# Patient Record
Sex: FEMALE | Race: WHITE | NOT HISPANIC OR LATINO
[De-identification: names, ages, dates, MRNs, and addresses within clinical notes are randomized per-mention and may not be internally consistent; named-entity substitution may affect disease eponyms.]

---

## 2017-03-17 ENCOUNTER — APPOINTMENT (OUTPATIENT)
Dept: OBGYN | Facility: CLINIC | Age: 62
End: 2017-03-17

## 2017-03-17 VITALS
WEIGHT: 131.38 LBS | DIASTOLIC BLOOD PRESSURE: 70 MMHG | HEIGHT: 65 IN | SYSTOLIC BLOOD PRESSURE: 120 MMHG | BODY MASS INDEX: 21.89 KG/M2

## 2017-03-17 DIAGNOSIS — Z01.419 ENCOUNTER FOR GYNECOLOGICAL EXAMINATION (GENERAL) (ROUTINE) W/OUT ABNORMAL FINDINGS: ICD-10-CM

## 2017-03-23 LAB
CYTOLOGY CVX/VAG DOC THIN PREP: NORMAL
HPV DNA HIGH RISK: DETECTED
HPV DNA LOW RISK: DETECTED

## 2021-12-19 ENCOUNTER — EMERGENCY (EMERGENCY)
Facility: HOSPITAL | Age: 66
LOS: 1 days | Discharge: HOME | End: 2021-12-19
Attending: EMERGENCY MEDICINE | Admitting: EMERGENCY MEDICINE
Payer: MEDICARE

## 2021-12-19 VITALS
TEMPERATURE: 99 F | OXYGEN SATURATION: 99 % | DIASTOLIC BLOOD PRESSURE: 89 MMHG | HEART RATE: 80 BPM | SYSTOLIC BLOOD PRESSURE: 133 MMHG | RESPIRATION RATE: 18 BRPM

## 2021-12-19 VITALS
OXYGEN SATURATION: 99 % | SYSTOLIC BLOOD PRESSURE: 141 MMHG | DIASTOLIC BLOOD PRESSURE: 102 MMHG | HEART RATE: 94 BPM | RESPIRATION RATE: 18 BRPM | TEMPERATURE: 98 F | WEIGHT: 130.07 LBS

## 2021-12-19 DIAGNOSIS — R10.9 UNSPECIFIED ABDOMINAL PAIN: ICD-10-CM

## 2021-12-19 DIAGNOSIS — N20.1 CALCULUS OF URETER: ICD-10-CM

## 2021-12-19 DIAGNOSIS — Z20.822 CONTACT WITH AND (SUSPECTED) EXPOSURE TO COVID-19: ICD-10-CM

## 2021-12-19 DIAGNOSIS — R11.2 NAUSEA WITH VOMITING, UNSPECIFIED: ICD-10-CM

## 2021-12-19 DIAGNOSIS — Z88.2 ALLERGY STATUS TO SULFONAMIDES: ICD-10-CM

## 2021-12-19 LAB
ALBUMIN SERPL ELPH-MCNC: 4.9 G/DL — SIGNIFICANT CHANGE UP (ref 3.3–5)
ALP SERPL-CCNC: 69 U/L — SIGNIFICANT CHANGE UP (ref 40–120)
ALT FLD-CCNC: 15 U/L — SIGNIFICANT CHANGE UP (ref 10–45)
ANION GAP SERPL CALC-SCNC: 14 MMOL/L — SIGNIFICANT CHANGE UP (ref 5–17)
APPEARANCE UR: CLEAR — SIGNIFICANT CHANGE UP
AST SERPL-CCNC: 20 U/L — SIGNIFICANT CHANGE UP (ref 10–40)
BACTERIA # UR AUTO: PRESENT /HPF
BASOPHILS # BLD AUTO: 0 K/UL — SIGNIFICANT CHANGE UP (ref 0–0.2)
BASOPHILS NFR BLD AUTO: 0 % — SIGNIFICANT CHANGE UP (ref 0–2)
BILIRUB SERPL-MCNC: 0.5 MG/DL — SIGNIFICANT CHANGE UP (ref 0.2–1.2)
BILIRUB UR-MCNC: NEGATIVE — SIGNIFICANT CHANGE UP
BUN SERPL-MCNC: 16 MG/DL — SIGNIFICANT CHANGE UP (ref 7–23)
CALCIUM SERPL-MCNC: 9.3 MG/DL — SIGNIFICANT CHANGE UP (ref 8.4–10.5)
CHLORIDE SERPL-SCNC: 99 MMOL/L — SIGNIFICANT CHANGE UP (ref 96–108)
CO2 SERPL-SCNC: 21 MMOL/L — LOW (ref 22–31)
COLOR SPEC: YELLOW — SIGNIFICANT CHANGE UP
CREAT SERPL-MCNC: 1.14 MG/DL — SIGNIFICANT CHANGE UP (ref 0.5–1.3)
DIFF PNL FLD: ABNORMAL
EOSINOPHIL # BLD AUTO: 0 K/UL — SIGNIFICANT CHANGE UP (ref 0–0.5)
EOSINOPHIL NFR BLD AUTO: 0 % — SIGNIFICANT CHANGE UP (ref 0–6)
EPI CELLS # UR: ABNORMAL /HPF (ref 0–5)
GIANT PLATELETS BLD QL SMEAR: PRESENT — SIGNIFICANT CHANGE UP
GLUCOSE SERPL-MCNC: 181 MG/DL — HIGH (ref 70–99)
GLUCOSE UR QL: 250
HCT VFR BLD CALC: 39.8 % — SIGNIFICANT CHANGE UP (ref 34.5–45)
HGB BLD-MCNC: 13.8 G/DL — SIGNIFICANT CHANGE UP (ref 11.5–15.5)
HYPOCHROMIA BLD QL: SLIGHT — SIGNIFICANT CHANGE UP
KETONES UR-MCNC: 40 MG/DL
LEUKOCYTE ESTERASE UR-ACNC: NEGATIVE — SIGNIFICANT CHANGE UP
LYMPHOCYTES # BLD AUTO: 0.3 K/UL — LOW (ref 1–3.3)
LYMPHOCYTES # BLD AUTO: 2.6 % — LOW (ref 13–44)
MANUAL SMEAR VERIFICATION: SIGNIFICANT CHANGE UP
MCHC RBC-ENTMCNC: 29.2 PG — SIGNIFICANT CHANGE UP (ref 27–34)
MCHC RBC-ENTMCNC: 34.7 GM/DL — SIGNIFICANT CHANGE UP (ref 32–36)
MCV RBC AUTO: 84.3 FL — SIGNIFICANT CHANGE UP (ref 80–100)
MONOCYTES # BLD AUTO: 0 K/UL — SIGNIFICANT CHANGE UP (ref 0–0.9)
MONOCYTES NFR BLD AUTO: 0 % — LOW (ref 2–14)
NEUTROPHILS # BLD AUTO: 11.31 K/UL — HIGH (ref 1.8–7.4)
NEUTROPHILS NFR BLD AUTO: 97.4 % — HIGH (ref 43–77)
NITRITE UR-MCNC: NEGATIVE — SIGNIFICANT CHANGE UP
PH UR: 8.5 — HIGH (ref 5–8)
PLAT MORPH BLD: ABNORMAL
PLATELET # BLD AUTO: 264 K/UL — SIGNIFICANT CHANGE UP (ref 150–400)
POTASSIUM SERPL-MCNC: 3.7 MMOL/L — SIGNIFICANT CHANGE UP (ref 3.5–5.3)
POTASSIUM SERPL-SCNC: 3.7 MMOL/L — SIGNIFICANT CHANGE UP (ref 3.5–5.3)
PROT SERPL-MCNC: 8.2 G/DL — SIGNIFICANT CHANGE UP (ref 6–8.3)
PROT UR-MCNC: NEGATIVE MG/DL — SIGNIFICANT CHANGE UP
RBC # BLD: 4.72 M/UL — SIGNIFICANT CHANGE UP (ref 3.8–5.2)
RBC # FLD: 11.8 % — SIGNIFICANT CHANGE UP (ref 10.3–14.5)
RBC BLD AUTO: ABNORMAL
RBC CASTS # UR COMP ASSIST: > 10 /HPF
SARS-COV-2 RNA SPEC QL NAA+PROBE: SIGNIFICANT CHANGE UP
SODIUM SERPL-SCNC: 134 MMOL/L — LOW (ref 135–145)
SP GR SPEC: 1.01 — SIGNIFICANT CHANGE UP (ref 1–1.03)
UROBILINOGEN FLD QL: 0.2 E.U./DL — SIGNIFICANT CHANGE UP
WBC # BLD: 11.61 K/UL — HIGH (ref 3.8–10.5)
WBC # FLD AUTO: 11.61 K/UL — HIGH (ref 3.8–10.5)
WBC UR QL: < 5 /HPF — SIGNIFICANT CHANGE UP

## 2021-12-19 PROCEDURE — 80053 COMPREHEN METABOLIC PANEL: CPT

## 2021-12-19 PROCEDURE — 99284 EMERGENCY DEPT VISIT MOD MDM: CPT | Mod: 25

## 2021-12-19 PROCEDURE — 96375 TX/PRO/DX INJ NEW DRUG ADDON: CPT

## 2021-12-19 PROCEDURE — G1004: CPT

## 2021-12-19 PROCEDURE — 81001 URINALYSIS AUTO W/SCOPE: CPT

## 2021-12-19 PROCEDURE — 85025 COMPLETE CBC W/AUTO DIFF WBC: CPT

## 2021-12-19 PROCEDURE — U0003: CPT

## 2021-12-19 PROCEDURE — 36415 COLL VENOUS BLD VENIPUNCTURE: CPT

## 2021-12-19 PROCEDURE — 99285 EMERGENCY DEPT VISIT HI MDM: CPT

## 2021-12-19 PROCEDURE — 74176 CT ABD & PELVIS W/O CONTRAST: CPT | Mod: MG

## 2021-12-19 PROCEDURE — 74176 CT ABD & PELVIS W/O CONTRAST: CPT | Mod: 26,MG

## 2021-12-19 PROCEDURE — 96374 THER/PROPH/DIAG INJ IV PUSH: CPT

## 2021-12-19 PROCEDURE — U0005: CPT

## 2021-12-19 RX ORDER — TAMSULOSIN HYDROCHLORIDE 0.4 MG/1
1 CAPSULE ORAL
Qty: 7 | Refills: 0
Start: 2021-12-19 | End: 2021-12-25

## 2021-12-19 RX ORDER — KETOROLAC TROMETHAMINE 30 MG/ML
15 SYRINGE (ML) INJECTION ONCE
Refills: 0 | Status: DISCONTINUED | OUTPATIENT
Start: 2021-12-19 | End: 2021-12-19

## 2021-12-19 RX ORDER — IBUPROFEN 200 MG
1 TABLET ORAL
Qty: 30 | Refills: 0
Start: 2021-12-19

## 2021-12-19 RX ORDER — ONDANSETRON 8 MG/1
4 TABLET, FILM COATED ORAL ONCE
Refills: 0 | Status: COMPLETED | OUTPATIENT
Start: 2021-12-19 | End: 2021-12-19

## 2021-12-19 RX ORDER — ONDANSETRON 8 MG/1
1 TABLET, FILM COATED ORAL
Qty: 9 | Refills: 0
Start: 2021-12-19

## 2021-12-19 RX ORDER — MORPHINE SULFATE 50 MG/1
4 CAPSULE, EXTENDED RELEASE ORAL ONCE
Refills: 0 | Status: DISCONTINUED | OUTPATIENT
Start: 2021-12-19 | End: 2021-12-19

## 2021-12-19 RX ORDER — SODIUM CHLORIDE 9 MG/ML
1000 INJECTION INTRAMUSCULAR; INTRAVENOUS; SUBCUTANEOUS ONCE
Refills: 0 | Status: COMPLETED | OUTPATIENT
Start: 2021-12-19 | End: 2021-12-19

## 2021-12-19 RX ORDER — OXYCODONE HYDROCHLORIDE 5 MG/1
1 TABLET ORAL
Qty: 10 | Refills: 0
Start: 2021-12-19

## 2021-12-19 RX ORDER — METOCLOPRAMIDE HCL 10 MG
10 TABLET ORAL ONCE
Refills: 0 | Status: COMPLETED | OUTPATIENT
Start: 2021-12-19 | End: 2021-12-19

## 2021-12-19 RX ADMIN — MORPHINE SULFATE 4 MILLIGRAM(S): 50 CAPSULE, EXTENDED RELEASE ORAL at 10:04

## 2021-12-19 RX ADMIN — Medication 15 MILLIGRAM(S): at 08:47

## 2021-12-19 RX ADMIN — ONDANSETRON 4 MILLIGRAM(S): 8 TABLET, FILM COATED ORAL at 08:46

## 2021-12-19 RX ADMIN — Medication 10 MILLIGRAM(S): at 09:43

## 2021-12-19 RX ADMIN — SODIUM CHLORIDE 1000 MILLILITER(S): 9 INJECTION INTRAMUSCULAR; INTRAVENOUS; SUBCUTANEOUS at 08:46

## 2021-12-19 NOTE — ED PROVIDER NOTE - CLINICAL SUMMARY MEDICAL DECISION MAKING FREE TEXT BOX
67 y/o f presents c/o left flank pain, n/v since last night; pt afebrile in ED, uncomfortable on exam.  Labs and urine pending, CT done and result pending, H&P consistent with ureteral stone.  Pt given toradol and zofran, IV fluid, is improving.  Will f/u results and reassess.

## 2021-12-19 NOTE — ED PROVIDER NOTE - PATIENT PORTAL LINK FT
You can access the FollowMyHealth Patient Portal offered by [facility] by registering at the following website: [email or web address]. By joining [employer]’s FollowMyHealth portal, you will also be able to view your health information using other applications (apps) compatible with our system.

## 2021-12-19 NOTE — ED PROVIDER NOTE - NSFOLLOWUPCLINICS_GEN_ALL_ED_FT
[facility]rology [facility]  Urology  [address], [address]  [place], [place] [address]  Phone: [phone]  Fax:

## 2021-12-19 NOTE — ED PROVIDER NOTE - CARE PROVIDER_API CALL
Marly Julian  Urology  [address] [place] [address]  Phone: [phone]  Fax: [phone]  Follow Up Time:     Feliciano Pastrana)  Urology  [address] [place] [address]  Phone: [phone]  Fax: [phone]  Follow Up Time:

## 2021-12-19 NOTE — ED ADULT NURSE NOTE - OBJECTIVE STATEMENT
65yo female c/o of Flank Pain to left side x 2 days. 65yo female c/o of Flank Pain to left side x 2 days. PT advised she started feeling sick 5 days prior on Wednesday and yesterday started having vomiting and excruciating pain.

## 2021-12-19 NOTE — ED ADULT NURSE REASSESSMENT NOTE - NS ED NURSE REASSESS COMMENT FT1
PT voided with use of Bedpan. PT bed changed at this time as some urine got on her bed. PT noted able to stand up and move around freely under own power. PT noted with Kidney stone on CT Scan and pending disposition at this time. Daughter at bedside.

## 2021-12-19 NOTE — ED PROVIDER NOTE - CARE PROVIDERS DIRECT ADDRESSES
[email or web address]

## 2021-12-19 NOTE — ED PROVIDER NOTE - NSFOLLOWUPINSTRUCTIONS_ED_ALL_ED_FT
Ureteral Stones    WHAT YOU NEED TO KNOW:    A ureteral stone forms in the kidney and moves down the ureter and gets stuck there. The ureter is the tube that takes urine from the kidney to the bladder. Stones can form in the urinary system when your urine has high levels of minerals and salts. Urinary stones can be made of uric acid, calcium, phosphate, or oxalate crystals.     Kidney, Ureters, Bladder         DISCHARGE INSTRUCTIONS:    Seek care immediately if:   •You have severe pain that does not improve, even after you take medicine.      •You have vomiting that is not relieved by medicine.      Call your doctor if:   •You develop a fever.       •You have questions or concerns about your condition or care.      Medicines: You may need any of the following:   •NSAIDs, such as ibuprofen, help decrease swelling, pain, and fever. This medicine is available with or without a doctor's order. NSAIDs can cause stomach bleeding or kidney problems in certain people. If you take blood thinner medicine, always ask your healthcare provider if NSAIDs are safe for you. Always read the medicine label and follow directions.      •Prescription pain medicine may be given. Ask your healthcare provider how to take this medicine safely. Some prescription pain medicines contain acetaminophen. Do not take other medicines that contain acetaminophen without talking to your healthcare provider. Too much acetaminophen may cause liver damage. Prescription pain medicine may cause constipation. Ask your healthcare provider how to prevent or treat constipation.       •Nausea medicine may help calm your stomach and prevent vomiting.      •Take your medicine as directed. Contact your healthcare provider if you think your medicine is not helping or if you have side effects. Tell him or her if you are allergic to any medicine. Keep a list of the medicines, vitamins, and herbs you take. Include the amounts, and when and why you take them. Bring the list or the pill bottles to follow-up visits. Carry your medicine list with you in case of an emergency.      What you can do to manage uretal stones:   •Drink more liquids. Your healthcare provider may tell you to drink at least 8 to 12 (eight-ounce) cups of liquid each day. This helps flush out the stones when you urinate. Water is the best liquid to drink. Your urine will be clear (not yellow) if you are drinking enough liquid.      •Strain your urine every time you go to the bathroom. Urinate through a strainer or a piece of thin cloth to catch the stone. Take the stone to your healthcare provider so it can be sent to a lab for tests. This will help your healthcare providers plan the best treatment for you.  Look for Stones in the Filter           •Ask your healthcare provider about any nutrition changes you need to make. You may need to limit certain foods, such as foods high in sodium (salt) or protein. You may need to limit leafy green vegetables, carbonated drinks, or beer. These changes will depend on the kind of stones you have.  High Oxalate Foods           •Stay active. Your stones may pass more easily if you stay active. Exercise can also help you manage your weight. Ask about the best activities for you.  Ways to Be Physically Active           After you pass the ureteral stone: Your healthcare provider may order a 24-hour urine test. Results from a 24-hour urine test will help your healthcare provider plan ways to prevent more stones from forming. Your healthcare provider will give you more instructions.    Follow up with your doctor as directed: You may need to return for more tests. Write down your questions so you remember to ask them during your visits.

## 2021-12-19 NOTE — ED PROVIDER NOTE - OBJECTIVE STATEMENT
67 y/o f hx kidney stone presents c/o left flank pain which has been ongoing since last night, associated with nausea.  Pt stating pain is coming in waves, similar to previous kidney stone.  Pt has vomited a few times overnight, still feeling nauseous.  Pt also feeling the urge to urinate but not much comes out when she goes.  Denies fever, chills, dysuria, hematuria, CP, SOB, all other ROS negative.

## 2021-12-19 NOTE — ED PROVIDER NOTE - ATTENDING CONTRIBUTION TO CARE
66 year old female presents to ED with concern for left flank pain - notes history of stones, states this feels similar.  Discomfort began yesterday evening, with strong waves and associated n/v.  Notes urgency , no dysuria, hematuria or fever.  Took motrin yesterday, no pain medication today.  On exam, uncomfortable appearing.  HRRR, lungs clear.  Abd soft, NT/ND, no CVA tenderness, no tenderness to paraspinal msk.  Will obtain labs, CT imaging for stone barros, give pain medications, fluid and dispo accordingly.

## 2021-12-19 NOTE — ED PROVIDER NOTE - PROGRESS NOTE DETAILS
CT shows 3mm stone in right UVJ, pt feeling improved with pain meds, creatinine 1.1, given IV fluid.  Will d/c to f/u with urology, given pain meds, filter.  Pt advised to return to ED if sx worsen.

## 2021-12-29 ENCOUNTER — APPOINTMENT (OUTPATIENT)
Dept: UROLOGY | Facility: CLINIC | Age: 66
End: 2021-12-29
Payer: MEDICARE

## 2021-12-29 VITALS — SYSTOLIC BLOOD PRESSURE: 118 MMHG | DIASTOLIC BLOOD PRESSURE: 77 MMHG | HEART RATE: 80 BPM | TEMPERATURE: 97.6 F

## 2021-12-29 DIAGNOSIS — N20.0 CALCULUS OF KIDNEY: ICD-10-CM

## 2021-12-29 PROCEDURE — 99204 OFFICE O/P NEW MOD 45 MIN: CPT

## 2021-12-29 NOTE — PHYSICAL EXAM
[Normal Appearance] : normal appearance [General Appearance - In No Acute Distress] : no acute distress [Heart Rate And Rhythm] : Heart rate and rhythm were normal [] : no respiratory distress [Costovertebral Angle Tenderness] : no ~M costovertebral angle tenderness

## 2021-12-29 NOTE — HISTORY OF PRESENT ILLNESS
[FreeTextEntry1] : Name CLOTILDE WOO \par MRN [id number] \par  1955 \par ------------------------------------------------------------------------------------------------------------------------------------------- \par Date of First Visit: 21\par Referring Provider/PCP: unknown \par ------------------------------------------------------------------------------------------------------------------------------------------- \par CC: kidney stones \par \par History of Present Illness: CLOTILDE WOO is a 66 year F who presents for evaluation of kidney stones. She recently seen in the emergency department on  for right flank pain.  CT demonstrated a 4 mm LEFT UVJ stone. Since that time her pain has been paroxysmal and controllable with extra strength Tylenol.  Last episode of pain was yesterday.  Acknowledges that she has urinary urgency symptoms in addition to the left-sided renal colic, all of which is well controlled with the Tylenol.  Denies seeing any stone in her urine despite straining.  She is motivated to pass the stone spontaneously without intervention if possible.  Given her urinary urgency, she is concerned about a UTI.\par \par Imaging: CT scan from 21 can be found in [employer] PACS. Findings: 4 mm left UVJ stone.  No other kidney stones bilaterally.\par \par Previous urine cultures: None on record\par \par Kidney Stone History:  \par First-ever symptomatic stone - possibly. Patient states that she had a severe episode of right-sided flank pain approximately 25 to 30 years ago.  Due to unavailability of CT scan at that time it was never confirmed that she had a kidney stone.  She had never seen anything passing her urine.\par Concurrent asymptomatic stone(s) - None\par Prior incidental/asymptomatic stone(s) - No\par Comorbidities –noncontributory\par Family history of kidney stones -none \par Previous metabolic evaluation -none\par \par \par

## 2021-12-29 NOTE — ASSESSMENT
[FreeTextEntry1] : Assessment:  \par \par CLOTILDE WOO is a 66 year F with urolithiasis. She currently has a 4 mm stone in the left UVJ, diagnosed 1.5 weeks ago.  Pain is well controlled on Tylenol.  She has yet to see a stone in her urine.  Presumably this is her first-ever stone.  No known risk factors for recurrent nephrolithiasis.\par \par We discussed the risks, benefits, and alternatives for stone management, including watchful waiting (with or without medical expulsive therapy) and surgical intervention. \par \par Watchful waiting:  \par This is an acceptable initial approach for ureteral stones <10?mm if the stone can be expected to pass spontaneously within a reasonable time and the pain is tolerable without evidence of infection or renal compromise. I explained that there is a strong correlation between stone size and location and the likelihood for spontaneous stone passage. In general, 68% of stones <5mm and 47% of stones >5mm will pass spontaneously over a mean time of approximately 2 weeks. This rate for upper, mid, and distal ureteral stones is approximately 50%, 60% and 70%, respectively. While many advocate intervention if the stone fails to pass within 4 weeks of the start of symptoms, there is evidence of spontaneous passage rates up to 98% at 6 weeks for stones of <5mm. Therefore, longer follow-up (up to 6 weeks) may be warranted, particularly for smaller stones. \par \par  * Medical expulsive therapy (MET) may be added to watchful waiting as it is thought to expedite stone expulsion. Traditionally, off-label alpha-blocker therapy is prescribed based on prior trials supporting their use in distal stones >5mm. However, I explained that this represents an increasingly controversial topic due to the contradictory results of more contemporary high-quality studies, the number of which outweigh those demonstrating a therapeutic benefit. At the same time, the risks of short-term alpha-blocker therapy are low and therefore this may be an option for well-informed, select patients. \par \par Shock Wave Lithotripsy (SWL):  \par This is the least invasive form of surgery for stones and an excellent option for select stones. For ureteral stones, SWL is limited to the upper ureter. I explained how the procedure is performed and the concept behind shock waves. Procedural success is dependent on several stone and environmental factors such as the stone composition or density on CT, the presence or absence of hydronephrosis, size of the stone, stone location, and skin-to-stone distance on CT scan (patient’s body habitus). For these reasons, not all stones/patients are good candidates for SWL. The chances of being stone free after SWL are often much lower compared to other modalities, such as ureteroscopy, except in select cases where stone-free rates are comparable. Therefore, there is a risk that the patient would need subsequent procedures to render them stone-free. Since this is a non-invasive procedure, we are relying on the kidney to spontaneously pass the resultant stone fragments. At the same time, this procedure does carry some perioperative risks, mainly bleeding and infection, as well as the small risk of developing obstruction due to passage of stone fragments, which could require urgent placement of a double-J ureteral stent or nephrostomy tube. \par \par Ureteroscopy:  \par I explained the technique in detail and how it is performed. Though more invasive than SWL, high stone free rates (approaching 90% for ureteral stones; 60% for renal stones) have made it increasingly popular among physicians and patients. Very commonly, a ureteral stent is left in place at the conclusion of the procedure, but only if needed. I explained that if a stent is placed, it would need to be removed either cystoscopically under local anesthesia or it may have a string left externally through the urethra for removal in a couple of days after the procedure. Risks of ureteroscopy include, but are not limited to, bleeding, infection, injury to the bladder or ureter, ureteral perforation, ureteral stricture, and other risks involved with general anesthesia. There is also the risk that the procedure needs to be staged into more than one session based on the patient's internal anatomy and the size of the stone(s). Finally, dilation of the ureter and/or ureteral stent placement prior to definitive ureteroscopy may be necessary to achieve ureteral access safely. \par \par I explained all these options to the patient and my assessment of the risks and benefits of each approach. \par \par I have answered all the patient’s questions and she has elected to undergo a period of watchful waiting to attempt spontaneous stone passage. Strict precautions to contact our team were given for intractable pain, inability to tolerate oral intake, fevers/chills concerning for infection, or any other concerns.  \par \par \par Plan:  \par \par - UA/UCx\par - Continue to strain urine\par - Follow-up in 4 weeks with non-contrast CT scan performed just prior to visit\par - Instructed to contact the office if she sees the stone in her urine\par \par \par

## 2021-12-30 ENCOUNTER — NON-APPOINTMENT (OUTPATIENT)
Age: 66
End: 2021-12-30

## 2021-12-30 LAB
APPEARANCE: CLEAR
BACTERIA: NEGATIVE
BILIRUBIN URINE: NEGATIVE
BLOOD URINE: NEGATIVE
COLOR: COLORLESS
GLUCOSE QUALITATIVE U: NEGATIVE
HYALINE CASTS: 0 /LPF
KETONES URINE: NEGATIVE
LEUKOCYTE ESTERASE URINE: NEGATIVE
MICROSCOPIC-UA: NORMAL
NITRITE URINE: NEGATIVE
PH URINE: 6.5
PROTEIN URINE: NEGATIVE
RED BLOOD CELLS URINE: 1 /HPF
SPECIFIC GRAVITY URINE: 1
SQUAMOUS EPITHELIAL CELLS: 1 /HPF
UROBILINOGEN URINE: NORMAL
WHITE BLOOD CELLS URINE: 0 /HPF

## 2022-01-03 LAB — BACTERIA UR CULT: NORMAL

## 2022-10-01 ENCOUNTER — EMERGENCY (EMERGENCY)
Facility: HOSPITAL | Age: 67
LOS: 1 days | Discharge: HOME | End: 2022-10-01
Admitting: EMERGENCY MEDICINE
Payer: MEDICARE

## 2022-10-01 VITALS
HEART RATE: 98 BPM | HEIGHT: 64 IN | TEMPERATURE: 98 F | SYSTOLIC BLOOD PRESSURE: 133 MMHG | WEIGHT: 130.95 LBS | OXYGEN SATURATION: 97 % | DIASTOLIC BLOOD PRESSURE: 84 MMHG | RESPIRATION RATE: 16 BRPM

## 2022-10-01 PROCEDURE — 99283 EMERGENCY DEPT VISIT LOW MDM: CPT

## 2022-10-01 NOTE — ED PROVIDER NOTE - OBJECTIVE STATEMENT
66 yo f with no pmh c/o hitting her head on a doorknob 2 days ago. pt was bending down to tie her shoes and she stood up quick and hit her head on the doorknob. Denies LOC. Pt denies ha, dizziness, n/v, visual changes. Pt not on blood thinners. Pt was feeling anxious so decided to come to the ED to be evaluated.

## 2022-10-01 NOTE — ED ADULT NURSE NOTE - OBJECTIVE STATEMENT
67 y.o female reports to ED c/o head discomfort after hitting back of head on door knob thursday. Pt denies dizziness, change in vision, n/v, LOC, anticoagulant use.

## 2022-10-01 NOTE — ED PROVIDER NOTE - PATIENT PORTAL LINK FT
You can access the FollowMyHealth Patient Portal offered by [facility] by registering at the following website: [email or web address]. By joining [employer]’s FollowMyHealth portal, you will also be able to view your health information using other applications (apps) compatible with our system.

## 2022-10-01 NOTE — ED ADULT TRIAGE NOTE - CHIEF COMPLAINT QUOTE
pt stated " I hit my head with door last Thursday and I'm concerned"  denies blood thinners use, or LOC.

## 2022-10-01 NOTE — ED PROVIDER NOTE - CLINICAL SUMMARY MEDICAL DECISION MAKING FREE TEXT BOX
66 yo f with no pmh c/o hitting her head on a doorknob 2 days ago. pt was bending down to tie her shoes and she stood up quick and hit her head on the doorknob. Denies LOC. Pt denies ha, dizziness, n/v, visual changes. Pt not on blood thinners. Pt was feeling anxious so decided to come to the ED to be evaluated. pt anxious appearing. No signs of head trauma. Neurologically intact. No indication for CT scan. Pt reassured. Return precautions discussed

## 2022-10-01 NOTE — ED PROVIDER NOTE - PHYSICAL EXAMINATION
CONSTITUTIONAL: pt anxious   HEAD: Normocephalic; atraumatic.   EYES: PERRL; EOM intact; conjunctiva and sclera clear  ENT: normal nose; no rhinorrhea; normal pharynx with no erythema or lesions.   NECK: Supple; non-tender;   CARDIOVASCULAR: rrr, no audible murmurs   RESPIRATORY: Breathing easily;   MSK: FROM at all extremities, normal tone   EXT: No cyanosis or edema; N/V intact  SKIN: Normal for age and race; warm; dry; good turgor; no apparent lesions or rash.  neuro: AAO x 3, CN II-XII intact, normal speech, strength 5/5 bilateral upper and lower extremities, sensation intact, cerebellum intact, no ataxia, follows commands appropriately

## 2022-10-03 DIAGNOSIS — S09.90XA UNSPECIFIED INJURY OF HEAD, INITIAL ENCOUNTER: ICD-10-CM
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
## 2022-10-03 DIAGNOSIS — Z88.2 ALLERGY STATUS TO SULFONAMIDES: ICD-10-CM

## 2022-10-03 DIAGNOSIS — S00.03XA CONTUSION OF SCALP, INITIAL ENCOUNTER: ICD-10-CM

## 2023-07-03 ENCOUNTER — EMERGENCY (EMERGENCY)
Facility: HOSPITAL | Age: 68
LOS: 1 days | Discharge: HOME | End: 2023-07-03
Attending: STUDENT IN AN ORGANIZED HEALTH CARE EDUCATION/TRAINING PROGRAM | Admitting: STUDENT IN AN ORGANIZED HEALTH CARE EDUCATION/TRAINING PROGRAM
Payer: MEDICARE

## 2023-07-03 VITALS
TEMPERATURE: 98 F | HEART RATE: 78 BPM | RESPIRATION RATE: 18 BRPM | OXYGEN SATURATION: 98 % | SYSTOLIC BLOOD PRESSURE: 126 MMHG | DIASTOLIC BLOOD PRESSURE: 81 MMHG

## 2023-07-03 VITALS
HEIGHT: 65 IN | HEART RATE: 83 BPM | SYSTOLIC BLOOD PRESSURE: 119 MMHG | WEIGHT: 134.92 LBS | RESPIRATION RATE: 16 BRPM | TEMPERATURE: 98 F | OXYGEN SATURATION: 98 % | DIASTOLIC BLOOD PRESSURE: 79 MMHG

## 2023-07-03 DIAGNOSIS — Z88.2 ALLERGY STATUS TO SULFONAMIDES: ICD-10-CM
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
## 2023-07-03 DIAGNOSIS — M79.662 PAIN IN LEFT LOWER LEG: ICD-10-CM

## 2023-07-03 DIAGNOSIS — S80.12XA CONTUSION OF LEFT LOWER LEG, INITIAL ENCOUNTER: ICD-10-CM
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
## 2023-07-03 DIAGNOSIS — S80.02XA CONTUSION OF LEFT KNEE, INITIAL ENCOUNTER: ICD-10-CM

## 2023-07-03 PROCEDURE — 73590 X-RAY EXAM OF LOWER LEG: CPT

## 2023-07-03 PROCEDURE — 73564 X-RAY EXAM KNEE 4 OR MORE: CPT

## 2023-07-03 PROCEDURE — 73552 X-RAY EXAM OF FEMUR 2/>: CPT

## 2023-07-03 PROCEDURE — 73564 X-RAY EXAM KNEE 4 OR MORE: CPT | Mod: 26,LT

## 2023-07-03 PROCEDURE — 73590 X-RAY EXAM OF LOWER LEG: CPT | Mod: 26,LT

## 2023-07-03 PROCEDURE — 99285 EMERGENCY DEPT VISIT HI MDM: CPT

## 2023-07-03 PROCEDURE — 93971 EXTREMITY STUDY: CPT | Mod: 26,LT

## 2023-07-03 PROCEDURE — 93971 EXTREMITY STUDY: CPT

## 2023-07-03 PROCEDURE — 73552 X-RAY EXAM OF FEMUR 2/>: CPT | Mod: 26,LT

## 2023-07-03 PROCEDURE — 99284 EMERGENCY DEPT VISIT MOD MDM: CPT | Mod: 25

## 2023-07-03 NOTE — ED PROVIDER NOTE - PROGRESS NOTE DETAILS
MD Micha:   Ultrasound duplex of lower extremity shows no DVT, x-ray on my read shows no acute fracture or dislocation.  I explained to patient that the x-ray final read by the attending radiologist will likely be available on follow my health by tomorrow and she should follow-up with the final read.  Patient states understanding.  I explained to patient that if her symptoms  to persist or worsen that she should follow up with a repeat ultrasound within 1 week.  Patient states understanding.  Patient states she has her own orthopedic surgeon to follow-up with.  Will DC with Ace wrap and instructions for rest, ice, compression, and elevation.

## 2023-07-03 NOTE — ED PROVIDER NOTE - PATIENT PORTAL LINK FT
You can access the FollowMyHealth Patient Portal offered by [facility] by registering at the following website: [email or web address]. By joining [employer]’s FollowMyHealth portal, you will also be able to view your health information using other applications (apps) compatible with our system.
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
## 2023-07-03 NOTE — ED PROVIDER NOTE - WR ORDER DATE AND TIME 3
"Prince Mazariegos is a 68 y.o. female here for a non-provider visit for:   FLU    Reason for immunization: Annual Flu Vaccine  Immunization records indicate need for vaccine: Yes, confirmed with Epic  Minimum interval has been met for this vaccine: Yes  ABN completed: No    Order and dose verified by: ACE  VIS Dated  8/7/15 was given to patient: Yes  All IAC Questionnaire questions were answered \"No.\"    Patient tolerated injection and no adverse effects were observed or reported: Yes    Pt scheduled for next dose in series: No    " 03-Jul-2023 19:12

## 2023-07-03 NOTE — ED PROVIDER NOTE - NS ED ROS FT
Positive: Left leg pain, left knee pain and bruising   negative: Fever, chills, congestion, cough, chest pain, shortness of breath, nausea, vomiting, diarrhea, abdominal pain, dysuria, hematuria, rash, focal numbness or weakness

## 2023-07-03 NOTE — ED ADULT NURSE NOTE - NSFALLRISKINTERV_ED_ALL_ED
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
## 2023-07-03 NOTE — ED PROVIDER NOTE - PHYSICAL EXAMINATION
GENERAL:  Awake, alert and in NAD, non-toxic appearing  ENMT: Airway patent  EYES: conjunctiva clear  CARDIAC: Regular rate, regular rhythm.  Heart sounds S1, S2, no S3, S4. No murmurs, rubs or gallops.  RESPIRATORY: Breath sounds clear and equal in bilateral anterior lung fields, no wheezes/ronchi/crackles/stridor; pt breathing and speaking comfortably with no increased WOB, no accessory mm. use, no intercostal retractions, no nasal flaring  GI: abdomen soft, non-distended, non-tender, no rebound or guarding.  SKIN: warm and dry, no rashes  PSYCH: awake, alert, calm and cooperative  EXTREMITIES:   BLE: 5/5 motor strength bilat hip flexors, knee flexors and extensors, plantar and dorsiflexors, hallux flexors and extensors. sensation intact to light touch bilat L3-S1; 2+ DP pulses bilat; compartment soft, skin: ecchymosis over anterior L knee and anterior L lower leg, skin is intact. no point ttp over medial/lateral malleolus or midfoot   NEURO: gcs 15
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
## 2023-07-03 NOTE — ED PROVIDER NOTE - NSFOLLOWUPINSTRUCTIONS_ED_ALL_ED_FT
1. You were seen for leg pain. A copy of any of your resulted labs, imaging, and findings have been provided to you. Make sure to view any test results that may not have yet resulted at the time of your discharge by creating a FollowMyHealth account at: [email or web address] to sign up for FollowMyHealth. Please review all of your lab, imaging, and all other results in their entirety with your primary care doctor.   2. Continue to take your home medications as prescribed. Keep your left leg elevated, iced, rested, and compressed with an ace wrap. Take over the counter motrin 600 mg with food every six hours (do not exceed 3,200 mg in a 24 hour period) and supplement with tylenol 650 mg every six hours (do not exceed 4000 mg of tylenol in a 24 hour period and do not drink alcohol while taking tylenol) between the motrin dosages to have a layered effect. Consult a pharmacist or your primary care doctor with any questions.   IF YOU LEG SWELLING OR PAIN PERSIST, OBTAIN A REPEAT ULTRASOUND OF YOUR LEFT LEG TO ASSESS FOR DEEP VEIN THROMBOSIS (BLOOD CLOT) IN 1 WEEK.  3. Follow up with your primary care doctor within 48 hours to assess the symptoms you were seen for in the emergency department and to review all results from your visit. If you don't have a doctor, call [phone] to make an appointment.  4. Return immediately to the emergency department for new, persistent, or worsening symptoms or signs. Return immediately to the emergency department if you have chest pain, shortness of breath, loss of consciousness, inability to move or feel your leg, rash, or worsening swelling.   5. For your for health, you should make healthy food choices and be physically active. Also, you should not smoke or use drugs, and you should not drink alcohol in excess. Please visit [email or web address] for resources and more information.       Please reach out to Nicol Lyle ([facility] ED clinical referral coordinator) to assist you with your follow-up appointment With an orthopedic surgeon if you are unable to reach her 1.    Monday - Friday 11am-7pm  [phone]  [email or web address]

## 2023-11-22 ENCOUNTER — EMERGENCY (EMERGENCY)
Facility: HOSPITAL | Age: 68
LOS: 1 days | Discharge: HOME | End: 2023-11-22
Attending: EMERGENCY MEDICINE | Admitting: EMERGENCY MEDICINE
Payer: MEDICARE

## 2023-11-22 ENCOUNTER — TRANSCRIPTION ENCOUNTER (OUTPATIENT)
Age: 68
End: 2023-11-22

## 2023-11-22 VITALS
WEIGHT: 121.92 LBS | OXYGEN SATURATION: 98 % | SYSTOLIC BLOOD PRESSURE: 124 MMHG | RESPIRATION RATE: 15 BRPM | HEART RATE: 65 BPM | DIASTOLIC BLOOD PRESSURE: 77 MMHG | TEMPERATURE: 98 F

## 2023-11-22 VITALS
DIASTOLIC BLOOD PRESSURE: 73 MMHG | RESPIRATION RATE: 18 BRPM | OXYGEN SATURATION: 99 % | TEMPERATURE: 99 F | HEART RATE: 65 BPM | SYSTOLIC BLOOD PRESSURE: 119 MMHG

## 2023-11-22 LAB
ANION GAP SERPL CALC-SCNC: 13 MMOL/L — SIGNIFICANT CHANGE UP (ref 5–17)
ANION GAP SERPL CALC-SCNC: 13 MMOL/L — SIGNIFICANT CHANGE UP (ref 5–17)
BASOPHILS # BLD AUTO: 0.06 K/UL — SIGNIFICANT CHANGE UP (ref 0–0.2)
BASOPHILS # BLD AUTO: 0.06 K/UL — SIGNIFICANT CHANGE UP (ref 0–0.2)
BASOPHILS NFR BLD AUTO: 0.6 % — SIGNIFICANT CHANGE UP (ref 0–2)
BASOPHILS NFR BLD AUTO: 0.6 % — SIGNIFICANT CHANGE UP (ref 0–2)
BUN SERPL-MCNC: 11 MG/DL — SIGNIFICANT CHANGE UP (ref 7–23)
BUN SERPL-MCNC: 11 MG/DL — SIGNIFICANT CHANGE UP (ref 7–23)
CALCIUM SERPL-MCNC: 9.5 MG/DL — SIGNIFICANT CHANGE UP (ref 8.4–10.5)
CALCIUM SERPL-MCNC: 9.5 MG/DL — SIGNIFICANT CHANGE UP (ref 8.4–10.5)
CHLORIDE SERPL-SCNC: 98 MMOL/L — SIGNIFICANT CHANGE UP (ref 96–108)
CHLORIDE SERPL-SCNC: 98 MMOL/L — SIGNIFICANT CHANGE UP (ref 96–108)
CO2 SERPL-SCNC: 23 MMOL/L — SIGNIFICANT CHANGE UP (ref 22–31)
CO2 SERPL-SCNC: 23 MMOL/L — SIGNIFICANT CHANGE UP (ref 22–31)
CREAT SERPL-MCNC: 0.65 MG/DL — SIGNIFICANT CHANGE UP (ref 0.5–1.3)
CREAT SERPL-MCNC: 0.65 MG/DL — SIGNIFICANT CHANGE UP (ref 0.5–1.3)
EGFR: 96 ML/MIN/1.73M2 — SIGNIFICANT CHANGE UP
EGFR: 96 ML/MIN/1.73M2 — SIGNIFICANT CHANGE UP
EOSINOPHIL # BLD AUTO: 0.02 K/UL — SIGNIFICANT CHANGE UP (ref 0–0.5)
EOSINOPHIL # BLD AUTO: 0.02 K/UL — SIGNIFICANT CHANGE UP (ref 0–0.5)
EOSINOPHIL NFR BLD AUTO: 0.2 % — SIGNIFICANT CHANGE UP (ref 0–6)
EOSINOPHIL NFR BLD AUTO: 0.2 % — SIGNIFICANT CHANGE UP (ref 0–6)
GLUCOSE SERPL-MCNC: 105 MG/DL — HIGH (ref 70–99)
GLUCOSE SERPL-MCNC: 105 MG/DL — HIGH (ref 70–99)
HCT VFR BLD CALC: 40.9 % — SIGNIFICANT CHANGE UP (ref 34.5–45)
HCT VFR BLD CALC: 40.9 % — SIGNIFICANT CHANGE UP (ref 34.5–45)
HGB BLD-MCNC: 13.6 G/DL — SIGNIFICANT CHANGE UP (ref 11.5–15.5)
HGB BLD-MCNC: 13.6 G/DL — SIGNIFICANT CHANGE UP (ref 11.5–15.5)
IMM GRANULOCYTES NFR BLD AUTO: 0.5 % — SIGNIFICANT CHANGE UP (ref 0–0.9)
IMM GRANULOCYTES NFR BLD AUTO: 0.5 % — SIGNIFICANT CHANGE UP (ref 0–0.9)
LYMPHOCYTES # BLD AUTO: 0.88 K/UL — LOW (ref 1–3.3)
LYMPHOCYTES # BLD AUTO: 0.88 K/UL — LOW (ref 1–3.3)
LYMPHOCYTES # BLD AUTO: 8.6 % — LOW (ref 13–44)
LYMPHOCYTES # BLD AUTO: 8.6 % — LOW (ref 13–44)
MCHC RBC-ENTMCNC: 28.8 PG — SIGNIFICANT CHANGE UP (ref 27–34)
MCHC RBC-ENTMCNC: 28.8 PG — SIGNIFICANT CHANGE UP (ref 27–34)
MCHC RBC-ENTMCNC: 33.3 GM/DL — SIGNIFICANT CHANGE UP (ref 32–36)
MCHC RBC-ENTMCNC: 33.3 GM/DL — SIGNIFICANT CHANGE UP (ref 32–36)
MCV RBC AUTO: 86.5 FL — SIGNIFICANT CHANGE UP (ref 80–100)
MCV RBC AUTO: 86.5 FL — SIGNIFICANT CHANGE UP (ref 80–100)
MONOCYTES # BLD AUTO: 0.63 K/UL — SIGNIFICANT CHANGE UP (ref 0–0.9)
MONOCYTES # BLD AUTO: 0.63 K/UL — SIGNIFICANT CHANGE UP (ref 0–0.9)
MONOCYTES NFR BLD AUTO: 6.2 % — SIGNIFICANT CHANGE UP (ref 2–14)
MONOCYTES NFR BLD AUTO: 6.2 % — SIGNIFICANT CHANGE UP (ref 2–14)
NEUTROPHILS # BLD AUTO: 8.57 K/UL — HIGH (ref 1.8–7.4)
NEUTROPHILS # BLD AUTO: 8.57 K/UL — HIGH (ref 1.8–7.4)
NEUTROPHILS NFR BLD AUTO: 83.9 % — HIGH (ref 43–77)
NEUTROPHILS NFR BLD AUTO: 83.9 % — HIGH (ref 43–77)
NRBC # BLD: 0 /100 WBCS — SIGNIFICANT CHANGE UP (ref 0–0)
NRBC # BLD: 0 /100 WBCS — SIGNIFICANT CHANGE UP (ref 0–0)
PLATELET # BLD AUTO: 262 K/UL — SIGNIFICANT CHANGE UP (ref 150–400)
PLATELET # BLD AUTO: 262 K/UL — SIGNIFICANT CHANGE UP (ref 150–400)
POTASSIUM SERPL-MCNC: 4 MMOL/L — SIGNIFICANT CHANGE UP (ref 3.5–5.3)
POTASSIUM SERPL-MCNC: 4 MMOL/L — SIGNIFICANT CHANGE UP (ref 3.5–5.3)
POTASSIUM SERPL-SCNC: 4 MMOL/L — SIGNIFICANT CHANGE UP (ref 3.5–5.3)
POTASSIUM SERPL-SCNC: 4 MMOL/L — SIGNIFICANT CHANGE UP (ref 3.5–5.3)
RBC # BLD: 4.73 M/UL — SIGNIFICANT CHANGE UP (ref 3.8–5.2)
RBC # BLD: 4.73 M/UL — SIGNIFICANT CHANGE UP (ref 3.8–5.2)
RBC # FLD: 12 % — SIGNIFICANT CHANGE UP (ref 10.3–14.5)
RBC # FLD: 12 % — SIGNIFICANT CHANGE UP (ref 10.3–14.5)
SODIUM SERPL-SCNC: 134 MMOL/L — LOW (ref 135–145)
SODIUM SERPL-SCNC: 134 MMOL/L — LOW (ref 135–145)
WBC # BLD: 10.21 K/UL — SIGNIFICANT CHANGE UP (ref 3.8–10.5)
WBC # BLD: 10.21 K/UL — SIGNIFICANT CHANGE UP (ref 3.8–10.5)
WBC # FLD AUTO: 10.21 K/UL — SIGNIFICANT CHANGE UP (ref 3.8–10.5)
WBC # FLD AUTO: 10.21 K/UL — SIGNIFICANT CHANGE UP (ref 3.8–10.5)

## 2023-11-22 PROCEDURE — 36415 COLL VENOUS BLD VENIPUNCTURE: CPT

## 2023-11-22 PROCEDURE — 80048 BASIC METABOLIC PNL TOTAL CA: CPT

## 2023-11-22 PROCEDURE — 93005 ELECTROCARDIOGRAM TRACING: CPT

## 2023-11-22 PROCEDURE — 99284 EMERGENCY DEPT VISIT MOD MDM: CPT

## 2023-11-22 PROCEDURE — 93010 ELECTROCARDIOGRAM REPORT: CPT

## 2023-11-22 PROCEDURE — 70450 CT HEAD/BRAIN W/O DYE: CPT | Mod: 26,MA

## 2023-11-22 PROCEDURE — 99285 EMERGENCY DEPT VISIT HI MDM: CPT | Mod: 25

## 2023-11-22 PROCEDURE — 85025 COMPLETE CBC W/AUTO DIFF WBC: CPT

## 2023-11-22 PROCEDURE — 70450 CT HEAD/BRAIN W/O DYE: CPT | Mod: MA

## 2023-11-22 RX ORDER — SODIUM CHLORIDE 9 MG/ML
500 INJECTION INTRAMUSCULAR; INTRAVENOUS; SUBCUTANEOUS ONCE
Refills: 0 | Status: COMPLETED | OUTPATIENT
Start: 2023-11-22 | End: 2023-11-22

## 2023-11-22 RX ADMIN — SODIUM CHLORIDE 1000 MILLILITER(S): 9 INJECTION INTRAMUSCULAR; INTRAVENOUS; SUBCUTANEOUS at 13:02

## 2023-11-22 NOTE — ED PROVIDER NOTE - CLINICAL SUMMARY MEDICAL DECISION MAKING FREE TEXT BOX
Patient is a 68 F who presents after an episode of syncope. No bloody BM/melena to suggest anemia s/p endoscopy but will evaluate Hgb/electrolytes on CBC/BMP. Patient has been experiencing palpitations, but EKG wnl making cardiac syncope less likely. Presentation appears to be most consistent with vasovagal syncope. Getting head CT given history of fall with head strike.

## 2023-11-22 NOTE — ED ADULT TRIAGE NOTE - SPO2 (%)
98 Acitretin Counseling:  I discussed with the patient the risks of acitretin including but not limited to hair loss, dry lips/skin/eyes, liver damage, hyperlipidemia, depression/suicidal ideation, photosensitivity.  Serious rare side effects can include but are not limited to pancreatitis, pseudotumor cerebri, bony changes, clot formation/stroke/heart attack.  Patient understands that alcohol is contraindicated since it can result in liver toxicity and significantly prolong the elimination of the drug by many years.

## 2023-11-22 NOTE — ED ADULT NURSE NOTE - NEURO MENTATION
[FreeTextEntry1] : 65-year-old patient presented today for the postoperative follow-up.\par Nearly 1 months ago we did left hydrocelectomy and excision of the suspicious left inguinal skin wart was done nearly 2 weeks ago.\par His pathology showed benign lesion\par His main concern today is a exacerbation of the yeast infection in the left inguinal area and scrotum that causes uncomfortable feelings and sometimes pain normal

## 2023-11-22 NOTE — ED PROVIDER NOTE - ATTENDING CONTRIBUTION TO CARE
67yo PMHx of IBS w episode of syncope, yesterday w endoscopy w persisting bloating and removed polyp and found hiatal hernia. Feeling dizzy and small bm. non bloody, walking towards bedroom and loc. Then woke up on the floor. Now w head injury. Denies headache, palpitations, feeling of weakness, numbness, tingling. No bloody bm/melena. No headache, vision changes. Well appearing, nad, nc/at, lung cta, heart reg, abd soft, nt, ext no gross deformity, no gross neuro deficits, pending labs, CT head, reassess. 69yo PMHx of IBS w episode of syncope at 9a today, yesterday w endoscopy for persisting bloating, removed polyp and found hiatal hernia. Stated felt dizzy pta to fall. Had non bloody bm, walking towards bedroom and t hen loc. Then woke up on the floor. Now w head injury. Denies headache, palpitations, feeling of weakness, numbness, tingling speech changes, vision changes. Well appearing, nad, nc/at, lung cta, heart reg, abd soft, nt, ext no gross deformity, no gross neuro deficits, pending labs, CT head, reassess. EKG wnl presently, cardiac exam wnl, no other injury, no early Heart disease in family, no sudden death, no prior cardiac issues. Stated did not eat for entire day yesterday prior to endoscopy. SF syncope rule taken into decision making.

## 2023-11-22 NOTE — ED PROVIDER NOTE - NSFOLLOWUPINSTRUCTIONS_ED_ALL_ED_FT
Syncope, Adult    Syncope is when you pass out or faint for a short time. It is caused by a sudden decrease in blood flow to the brain. This can happen for many reasons. It can sometimes happen when seeing blood, getting a shot (injection), or having pain or strong emotions. Most causes of fainting are not dangerous, but in some cases it can be a sign of a serious medical problem.    If you faint, get help right away. Call your local emergency services (911 in the [place]    Follow these instructions at home:  Watch for any changes in your symptoms. Take these actions to stay safe and help with your symptoms:    Knowing when you may be about to faint    Signs that you may be about to faint include:  Feeling dizzy or light-headed. It may feel like the room is spinning.  Feeling weak.  Feeling like you may vomit (nauseous).  Seeing spots or seeing all white or all black.  Having cold, clammy skin.  Feeling warm and sweaty.  Hearing ringing in the ears.  If you start to feel like you might faint, sit or lie down right away. If sitting, lower your head down between your legs. If lying down, raise (elevate) your feet above the level of your heart.  Breathe deeply and steadily. Wait until all of the symptoms are gone.  Have someone stay with you until you feel better.      Medicines    Take over-the-counter and prescription medicines only as told by your doctor.  If you are taking blood pressure or heart medicine, sit up and stand up slowly. Spend a few minutes getting ready to sit and then stand. This can help you feel less dizzy.      Lifestyle    Do not drive, use machinery, or play sports until your doctor says it is okay.  Do not drink alcohol.  Do not smoke or use any products that contain nicotine or tobacco. If you need help quitting, ask your doctor.  Avoid hot tubs and saunas.      General instructions    Talk with your doctor about your symptoms. You may need to have testing to help find the cause.  Drink enough fluid to keep your pee (urine) pale yellow.  Avoid standing for a long time. If you must stand for a long time, do movements such as:  Moving your legs.  Crossing your legs.  Flexing and stretching your leg muscles.  Squatting.  Keep all follow-up visits.    Contact a doctor if:  You have episodes of near fainting.    Get help right away if:  You pass out or faint.  You hit your head or are injured after fainting.  You have any of these symptoms:  Fast or uneven heartbeats (palpitations).  Pain in your chest, belly, or back.  Shortness of breath.  You have jerky movements that you cannot control (seizure).  You have a very bad headache.  You are confused.  You have problems with how you see (vision).  You are very weak.  You have trouble walking.  You are bleeding from your mouth or your butt (rectum).  You have black or tarry poop (stool).  These symptoms may be an emergency. Get help right away. Call your local emergency services (911 in the [place]  Do not wait to see if the symptoms will go away.  Do not drive yourself to the hospital.      Summary  -Syncope is when you pass out or faint for a short time. It is caused by a sudden decrease in blood flow to the brain.  -Signs that you may be about to faint include feeling dizzy or light-headed, feeling like you may vomit, seeing all white or all black, or having cold, clammy skin.  -If you start to feel like you might faint, sit or lie down right away. Lower your head if sitting, or raise (elevate) your feet if lying down. Breathe deeply and steadily. Wait until all of the symptoms are gone.  -This information is not intended to replace advice given to you by your health care provider. Make sure you discuss any questions you have with your health care provider. [facility]  Family Medicine  [address] [place] [address]  Phone: [phone]      Syncope, Adult    Syncope is when you pass out or faint for a short time. It is caused by a sudden decrease in blood flow to the brain. This can happen for many reasons. It can sometimes happen when seeing blood, getting a shot (injection), or having pain or strong emotions. Most causes of fainting are not dangerous, but in some cases it can be a sign of a serious medical problem.    If you faint, get help right away. Call your local emergency services (911 in the [place]    Follow these instructions at home:  Watch for any changes in your symptoms. Take these actions to stay safe and help with your symptoms:    Knowing when you may be about to faint    Signs that you may be about to faint include:  Feeling dizzy or light-headed. It may feel like the room is spinning.  Feeling weak.  Feeling like you may vomit (nauseous).  Seeing spots or seeing all white or all black.  Having cold, clammy skin.  Feeling warm and sweaty.  Hearing ringing in the ears.  If you start to feel like you might faint, sit or lie down right away. If sitting, lower your head down between your legs. If lying down, raise (elevate) your feet above the level of your heart.  Breathe deeply and steadily. Wait until all of the symptoms are gone.  Have someone stay with you until you feel better.      Medicines    Take over-the-counter and prescription medicines only as told by your doctor.  If you are taking blood pressure or heart medicine, sit up and stand up slowly. Spend a few minutes getting ready to sit and then stand. This can help you feel less dizzy.      Lifestyle    Do not drive, use machinery, or play sports until your doctor says it is okay.  Do not drink alcohol.  Do not smoke or use any products that contain nicotine or tobacco. If you need help quitting, ask your doctor.  Avoid hot tubs and saunas.      General instructions    Talk with your doctor about your symptoms. You may need to have testing to help find the cause.  Drink enough fluid to keep your pee (urine) pale yellow.  Avoid standing for a long time. If you must stand for a long time, do movements such as:  Moving your legs.  Crossing your legs.  Flexing and stretching your leg muscles.  Squatting.  Keep all follow-up visits.    Contact a doctor if:  You have episodes of near fainting.    Get help right away if:  You pass out or faint.  You hit your head or are injured after fainting.  You have any of these symptoms:  Fast or uneven heartbeats (palpitations).  Pain in your chest, belly, or back.  Shortness of breath.  You have jerky movements that you cannot control (seizure).  You have a very bad headache.  You are confused.  You have problems with how you see (vision).  You are very weak.  You have trouble walking.  You are bleeding from your mouth or your butt (rectum).  You have black or tarry poop (stool).  These symptoms may be an emergency. Get help right away. Call your local emergency services (911 in the [place]  Do not wait to see if the symptoms will go away.  Do not drive yourself to the hospital.      Summary  -Syncope is when you pass out or faint for a short time. It is caused by a sudden decrease in blood flow to the brain.  -Signs that you may be about to faint include feeling dizzy or light-headed, feeling like you may vomit, seeing all white or all black, or having cold, clammy skin.  -If you start to feel like you might faint, sit or lie down right away. Lower your head if sitting, or raise (elevate) your feet if lying down. Breathe deeply and steadily. Wait until all of the symptoms are gone.  -This information is not intended to replace advice given to you by your health care provider. Make sure you discuss any questions you have with your health care provider.

## 2023-11-22 NOTE — ED PROVIDER NOTE - OBJECTIVE STATEMENT
The patient is a 68 F with PMHx of IBS who presents after an episode of syncope. Patient underwent endoscopy yesterday due to history of persistent bloating/IBS. Was fine after procedure, walked home without issue. Had small non-bloody BM the next morning, was walking in bedroom afterwards and suddenly lost consciousness, fell on floor. Does not remember event; most likely struck head and has bruise on forehead. Not on AC. The patient is a 68 F with PMHx of IBS who presents after an episode of syncope. Patient underwent endoscopy yesterday due to history of persistent bloating/IBS. Was fine after procedure, walked home and ate dinner without issue. Had small non-bloody BM the next morning, was walking in bedroom afterwards and suddenly lost consciousness, woke up on floor. Does not remember falling; most likely struck head and has bruise on forehead. Not on AC. Denies headache or vision changes. Denies difficulty ambulating or maintaining balance. No F/C or N/V. Denies Hematemesis or bloody BM/Melena. Endorses palpitations over the weekend, although they have not occurred since the procedure.

## 2023-11-22 NOTE — ED ADULT NURSE NOTE - OBJECTIVE STATEMENT
Pt presents to ED s/p diagnostic endoscopy yesterday under conscious sedation with syncopal event at approx 9AM today. Pt reports she felt well following procedure, was discharged and tolerated PO intake last night, awoke this morning feeling "woozy" and awoke on carpeted floor after unwitnessed syncope. Pt w +HS, bruising to L temporal region. Denies vision changes, endorses mild HA, remembers leading up to/after event. Pt denies CP, SOB, palpitations, dizziness. Reports she is feeling well now. A&ox4. Speaking in full coherent sentences. VSS. Ambulates using steady gait.

## 2023-11-22 NOTE — ED PROVIDER NOTE - PHYSICAL EXAMINATION
CONSTITUTIONAL: Well groomed, no apparent distress, has bruise of Left forehead  EYES: PERRLA and symmetric, EOMI  ENMT: Oral mucosa with moist membranes.  NECK: Supple, symmetric and without tracheal deviation   RESP: No respiratory distress, no use of accessory muscles; CTA b/l  CV: RRR, +S1S2, no MRG; no JVD; no peripheral edema  GI: Soft, NT, ND, no rebound, no guarding  MSK: Normal gait; Normal ROM without pain, no spinal tenderness, normal muscle strength/tone  SKIN: No rashes or ulcers noted  NEURO: CN II-XII intact; normal reflexes in upper and lower extremities, sensation intact in upper and lower extremities b/l to light touch

## 2023-11-22 NOTE — ED PROVIDER NOTE - PATIENT PORTAL LINK FT
You can access the FollowMyHealth Patient Portal offered by [facility] by registering at the following website: [email or web address]. By joining [employer]’s FollowMyHealth portal, you will also be able to view your health information using other applications (apps) compatible with our system.

## 2023-11-22 NOTE — ED PROVIDER NOTE - NS ED ATTENDING STATEMENT MOD
I have seen and examined this patient and fully participated in the care of this patient as the teaching attending.  The service was shared with the CHRISTOPHE.  I reviewed and verified the documentation and independently performed the documented:

## 2023-11-24 DIAGNOSIS — Z88.2 ALLERGY STATUS TO SULFONAMIDES: ICD-10-CM

## 2023-11-24 DIAGNOSIS — K44.9 DIAPHRAGMATIC HERNIA WITHOUT OBSTRUCTION OR GANGRENE: ICD-10-CM

## 2023-11-24 DIAGNOSIS — R00.2 PALPITATIONS: ICD-10-CM

## 2023-11-24 DIAGNOSIS — S00.83XA CONTUSION OF OTHER PART OF HEAD, INITIAL ENCOUNTER: ICD-10-CM
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
## 2023-11-24 DIAGNOSIS — R55 SYNCOPE AND COLLAPSE: ICD-10-CM
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
## 2023-11-24 DIAGNOSIS — Z86.010 PERSONAL HISTORY OF COLONIC POLYPS: ICD-10-CM

## 2023-11-24 DIAGNOSIS — Z87.19 PERSONAL HISTORY OF OTHER DISEASES OF THE DIGESTIVE SYSTEM: ICD-10-CM

## 2023-11-27 ENCOUNTER — EMERGENCY (EMERGENCY)
Facility: HOSPITAL | Age: 68
LOS: 1 days | Discharge: HOME | End: 2023-11-27
Attending: EMERGENCY MEDICINE | Admitting: EMERGENCY MEDICINE
Payer: MEDICARE

## 2023-11-27 VITALS
SYSTOLIC BLOOD PRESSURE: 131 MMHG | HEART RATE: 90 BPM | DIASTOLIC BLOOD PRESSURE: 85 MMHG | TEMPERATURE: 98 F | RESPIRATION RATE: 18 BRPM | OXYGEN SATURATION: 98 % | WEIGHT: 199.96 LBS

## 2023-11-27 VITALS
RESPIRATION RATE: 18 BRPM | HEART RATE: 76 BPM | DIASTOLIC BLOOD PRESSURE: 77 MMHG | SYSTOLIC BLOOD PRESSURE: 112 MMHG | TEMPERATURE: 98 F | OXYGEN SATURATION: 98 %

## 2023-11-27 PROBLEM — K58.9 IRRITABLE BOWEL SYNDROME, UNSPECIFIED: Chronic | Status: ACTIVE | Noted: 2023-11-22

## 2023-11-27 PROBLEM — K58.9 IRRITABLE BOWEL SYNDROME WITHOUT DIARRHEA: Chronic | Status: ACTIVE | Noted: 2023-11-22

## 2023-11-27 LAB
ALBUMIN SERPL ELPH-MCNC: 4.2 G/DL — SIGNIFICANT CHANGE UP (ref 3.3–5)
ALBUMIN SERPL ELPH-MCNC: 4.2 G/DL — SIGNIFICANT CHANGE UP (ref 3.3–5)
ALP SERPL-CCNC: 82 U/L — SIGNIFICANT CHANGE UP (ref 40–120)
ALP SERPL-CCNC: 82 U/L — SIGNIFICANT CHANGE UP (ref 40–120)
ALT FLD-CCNC: 13 U/L — SIGNIFICANT CHANGE UP (ref 10–45)
ALT FLD-CCNC: 13 U/L — SIGNIFICANT CHANGE UP (ref 10–45)
ANION GAP SERPL CALC-SCNC: 14 MMOL/L — SIGNIFICANT CHANGE UP (ref 5–17)
ANION GAP SERPL CALC-SCNC: 14 MMOL/L — SIGNIFICANT CHANGE UP (ref 5–17)
AST SERPL-CCNC: 17 U/L — SIGNIFICANT CHANGE UP (ref 10–40)
AST SERPL-CCNC: 17 U/L — SIGNIFICANT CHANGE UP (ref 10–40)
BASOPHILS # BLD AUTO: 0.04 K/UL — SIGNIFICANT CHANGE UP (ref 0–0.2)
BASOPHILS # BLD AUTO: 0.04 K/UL — SIGNIFICANT CHANGE UP (ref 0–0.2)
BASOPHILS NFR BLD AUTO: 0.6 % — SIGNIFICANT CHANGE UP (ref 0–2)
BASOPHILS NFR BLD AUTO: 0.6 % — SIGNIFICANT CHANGE UP (ref 0–2)
BILIRUB SERPL-MCNC: 0.5 MG/DL — SIGNIFICANT CHANGE UP (ref 0.2–1.2)
BILIRUB SERPL-MCNC: 0.5 MG/DL — SIGNIFICANT CHANGE UP (ref 0.2–1.2)
BUN SERPL-MCNC: 7 MG/DL — SIGNIFICANT CHANGE UP (ref 7–23)
BUN SERPL-MCNC: 7 MG/DL — SIGNIFICANT CHANGE UP (ref 7–23)
CALCIUM SERPL-MCNC: 9.6 MG/DL — SIGNIFICANT CHANGE UP (ref 8.4–10.5)
CALCIUM SERPL-MCNC: 9.6 MG/DL — SIGNIFICANT CHANGE UP (ref 8.4–10.5)
CHLORIDE SERPL-SCNC: 98 MMOL/L — SIGNIFICANT CHANGE UP (ref 96–108)
CHLORIDE SERPL-SCNC: 98 MMOL/L — SIGNIFICANT CHANGE UP (ref 96–108)
CK MB CFR SERPL CALC: 1.1 NG/ML — SIGNIFICANT CHANGE UP (ref 0–6.7)
CK MB CFR SERPL CALC: 1.1 NG/ML — SIGNIFICANT CHANGE UP (ref 0–6.7)
CK SERPL-CCNC: 40 U/L — SIGNIFICANT CHANGE UP (ref 25–170)
CK SERPL-CCNC: 40 U/L — SIGNIFICANT CHANGE UP (ref 25–170)
CO2 SERPL-SCNC: 23 MMOL/L — SIGNIFICANT CHANGE UP (ref 22–31)
CO2 SERPL-SCNC: 23 MMOL/L — SIGNIFICANT CHANGE UP (ref 22–31)
CREAT SERPL-MCNC: 0.7 MG/DL — SIGNIFICANT CHANGE UP (ref 0.5–1.3)
CREAT SERPL-MCNC: 0.7 MG/DL — SIGNIFICANT CHANGE UP (ref 0.5–1.3)
EGFR: 95 ML/MIN/1.73M2 — SIGNIFICANT CHANGE UP
EGFR: 95 ML/MIN/1.73M2 — SIGNIFICANT CHANGE UP
EOSINOPHIL # BLD AUTO: 0.01 K/UL — SIGNIFICANT CHANGE UP (ref 0–0.5)
EOSINOPHIL # BLD AUTO: 0.01 K/UL — SIGNIFICANT CHANGE UP (ref 0–0.5)
EOSINOPHIL NFR BLD AUTO: 0.2 % — SIGNIFICANT CHANGE UP (ref 0–6)
EOSINOPHIL NFR BLD AUTO: 0.2 % — SIGNIFICANT CHANGE UP (ref 0–6)
GLUCOSE SERPL-MCNC: 115 MG/DL — HIGH (ref 70–99)
GLUCOSE SERPL-MCNC: 115 MG/DL — HIGH (ref 70–99)
HCT VFR BLD CALC: 40.1 % — SIGNIFICANT CHANGE UP (ref 34.5–45)
HCT VFR BLD CALC: 40.1 % — SIGNIFICANT CHANGE UP (ref 34.5–45)
HGB BLD-MCNC: 13.6 G/DL — SIGNIFICANT CHANGE UP (ref 11.5–15.5)
HGB BLD-MCNC: 13.6 G/DL — SIGNIFICANT CHANGE UP (ref 11.5–15.5)
IMM GRANULOCYTES NFR BLD AUTO: 0.2 % — SIGNIFICANT CHANGE UP (ref 0–0.9)
IMM GRANULOCYTES NFR BLD AUTO: 0.2 % — SIGNIFICANT CHANGE UP (ref 0–0.9)
LYMPHOCYTES # BLD AUTO: 0.85 K/UL — LOW (ref 1–3.3)
LYMPHOCYTES # BLD AUTO: 0.85 K/UL — LOW (ref 1–3.3)
LYMPHOCYTES # BLD AUTO: 13.5 % — SIGNIFICANT CHANGE UP (ref 13–44)
LYMPHOCYTES # BLD AUTO: 13.5 % — SIGNIFICANT CHANGE UP (ref 13–44)
MAGNESIUM SERPL-MCNC: 1.9 MG/DL — SIGNIFICANT CHANGE UP (ref 1.6–2.6)
MAGNESIUM SERPL-MCNC: 1.9 MG/DL — SIGNIFICANT CHANGE UP (ref 1.6–2.6)
MCHC RBC-ENTMCNC: 28.9 PG — SIGNIFICANT CHANGE UP (ref 27–34)
MCHC RBC-ENTMCNC: 28.9 PG — SIGNIFICANT CHANGE UP (ref 27–34)
MCHC RBC-ENTMCNC: 33.9 GM/DL — SIGNIFICANT CHANGE UP (ref 32–36)
MCHC RBC-ENTMCNC: 33.9 GM/DL — SIGNIFICANT CHANGE UP (ref 32–36)
MCV RBC AUTO: 85.3 FL — SIGNIFICANT CHANGE UP (ref 80–100)
MCV RBC AUTO: 85.3 FL — SIGNIFICANT CHANGE UP (ref 80–100)
MONOCYTES # BLD AUTO: 0.45 K/UL — SIGNIFICANT CHANGE UP (ref 0–0.9)
MONOCYTES # BLD AUTO: 0.45 K/UL — SIGNIFICANT CHANGE UP (ref 0–0.9)
MONOCYTES NFR BLD AUTO: 7.2 % — SIGNIFICANT CHANGE UP (ref 2–14)
MONOCYTES NFR BLD AUTO: 7.2 % — SIGNIFICANT CHANGE UP (ref 2–14)
NEUTROPHILS # BLD AUTO: 4.92 K/UL — SIGNIFICANT CHANGE UP (ref 1.8–7.4)
NEUTROPHILS # BLD AUTO: 4.92 K/UL — SIGNIFICANT CHANGE UP (ref 1.8–7.4)
NEUTROPHILS NFR BLD AUTO: 78.3 % — HIGH (ref 43–77)
NEUTROPHILS NFR BLD AUTO: 78.3 % — HIGH (ref 43–77)
NRBC # BLD: 0 /100 WBCS — SIGNIFICANT CHANGE UP (ref 0–0)
NRBC # BLD: 0 /100 WBCS — SIGNIFICANT CHANGE UP (ref 0–0)
PHOSPHATE SERPL-MCNC: 1.5 MG/DL — LOW (ref 2.5–4.5)
PHOSPHATE SERPL-MCNC: 1.5 MG/DL — LOW (ref 2.5–4.5)
PLATELET # BLD AUTO: 258 K/UL — SIGNIFICANT CHANGE UP (ref 150–400)
PLATELET # BLD AUTO: 258 K/UL — SIGNIFICANT CHANGE UP (ref 150–400)
POTASSIUM SERPL-MCNC: 4.1 MMOL/L — SIGNIFICANT CHANGE UP (ref 3.5–5.3)
POTASSIUM SERPL-MCNC: 4.1 MMOL/L — SIGNIFICANT CHANGE UP (ref 3.5–5.3)
POTASSIUM SERPL-SCNC: 4.1 MMOL/L — SIGNIFICANT CHANGE UP (ref 3.5–5.3)
POTASSIUM SERPL-SCNC: 4.1 MMOL/L — SIGNIFICANT CHANGE UP (ref 3.5–5.3)
PROT SERPL-MCNC: 7.4 G/DL — SIGNIFICANT CHANGE UP (ref 6–8.3)
PROT SERPL-MCNC: 7.4 G/DL — SIGNIFICANT CHANGE UP (ref 6–8.3)
RBC # BLD: 4.7 M/UL — SIGNIFICANT CHANGE UP (ref 3.8–5.2)
RBC # BLD: 4.7 M/UL — SIGNIFICANT CHANGE UP (ref 3.8–5.2)
RBC # FLD: 12.1 % — SIGNIFICANT CHANGE UP (ref 10.3–14.5)
RBC # FLD: 12.1 % — SIGNIFICANT CHANGE UP (ref 10.3–14.5)
SODIUM SERPL-SCNC: 135 MMOL/L — SIGNIFICANT CHANGE UP (ref 135–145)
SODIUM SERPL-SCNC: 135 MMOL/L — SIGNIFICANT CHANGE UP (ref 135–145)
TSH SERPL-MCNC: 1.36 UIU/ML — SIGNIFICANT CHANGE UP (ref 0.27–4.2)
TSH SERPL-MCNC: 1.36 UIU/ML — SIGNIFICANT CHANGE UP (ref 0.27–4.2)
WBC # BLD: 6.28 K/UL — SIGNIFICANT CHANGE UP (ref 3.8–10.5)
WBC # BLD: 6.28 K/UL — SIGNIFICANT CHANGE UP (ref 3.8–10.5)
WBC # FLD AUTO: 6.28 K/UL — SIGNIFICANT CHANGE UP (ref 3.8–10.5)
WBC # FLD AUTO: 6.28 K/UL — SIGNIFICANT CHANGE UP (ref 3.8–10.5)

## 2023-11-27 PROCEDURE — 99285 EMERGENCY DEPT VISIT HI MDM: CPT

## 2023-11-27 PROCEDURE — 84443 ASSAY THYROID STIM HORMONE: CPT

## 2023-11-27 PROCEDURE — 99284 EMERGENCY DEPT VISIT MOD MDM: CPT | Mod: 25

## 2023-11-27 PROCEDURE — 84100 ASSAY OF PHOSPHORUS: CPT

## 2023-11-27 PROCEDURE — 96374 THER/PROPH/DIAG INJ IV PUSH: CPT

## 2023-11-27 PROCEDURE — 96375 TX/PRO/DX INJ NEW DRUG ADDON: CPT

## 2023-11-27 PROCEDURE — 93010 ELECTROCARDIOGRAM REPORT: CPT

## 2023-11-27 PROCEDURE — 80053 COMPREHEN METABOLIC PANEL: CPT

## 2023-11-27 PROCEDURE — 85025 COMPLETE CBC W/AUTO DIFF WBC: CPT

## 2023-11-27 PROCEDURE — 36415 COLL VENOUS BLD VENIPUNCTURE: CPT

## 2023-11-27 PROCEDURE — 83735 ASSAY OF MAGNESIUM: CPT

## 2023-11-27 PROCEDURE — 93005 ELECTROCARDIOGRAM TRACING: CPT

## 2023-11-27 PROCEDURE — 82550 ASSAY OF CK (CPK): CPT

## 2023-11-27 PROCEDURE — 82553 CREATINE MB FRACTION: CPT

## 2023-11-27 RX ORDER — POTASSIUM PHOSPHATE, MONOBASIC POTASSIUM PHOSPHATE, DIBASIC 236; 224 MG/ML; MG/ML
15 INJECTION, SOLUTION INTRAVENOUS ONCE
Refills: 0 | Status: COMPLETED | OUTPATIENT
Start: 2023-11-27 | End: 2023-11-27

## 2023-11-27 RX ORDER — PANTOPRAZOLE SODIUM 20 MG/1
40 TABLET, DELAYED RELEASE ORAL ONCE
Refills: 0 | Status: COMPLETED | OUTPATIENT
Start: 2023-11-27 | End: 2023-11-27

## 2023-11-27 RX ORDER — SODIUM CHLORIDE 9 MG/ML
1000 INJECTION INTRAMUSCULAR; INTRAVENOUS; SUBCUTANEOUS ONCE
Refills: 0 | Status: COMPLETED | OUTPATIENT
Start: 2023-11-27 | End: 2023-11-27

## 2023-11-27 RX ORDER — SODIUM,POTASSIUM PHOSPHATES 278-250MG
1 POWDER IN PACKET (EA) ORAL ONCE
Refills: 0 | Status: COMPLETED | OUTPATIENT
Start: 2023-11-27 | End: 2023-11-27

## 2023-11-27 RX ADMIN — Medication 1 PACKET(S): at 11:20

## 2023-11-27 RX ADMIN — POTASSIUM PHOSPHATE, MONOBASIC POTASSIUM PHOSPHATE, DIBASIC 62.5 MILLIMOLE(S): 236; 224 INJECTION, SOLUTION INTRAVENOUS at 12:34

## 2023-11-27 RX ADMIN — SODIUM CHLORIDE 1000 MILLILITER(S): 9 INJECTION INTRAMUSCULAR; INTRAVENOUS; SUBCUTANEOUS at 09:24

## 2023-11-27 RX ADMIN — PANTOPRAZOLE SODIUM 40 MILLIGRAM(S): 20 TABLET, DELAYED RELEASE ORAL at 11:20

## 2023-11-27 NOTE — ED ADULT TRIAGE NOTE - CHIEF COMPLAINT QUOTE
BIBEMS from urgent care, complaining of weakness for the past two days and multiple episodes of bowel movements. EKG in progress. BIBEMS from urgent care, complaining of weakness for the past two days and multiple episodes of bowel movements. Patient with a history of left sided mastectomy. Patient noted to have a 18G IV on the left AC by EMS. EKG in progress.

## 2023-11-27 NOTE — ED ADULT NURSE NOTE - CHIEF COMPLAINT QUOTE
BIBEMS from urgent care, complaining of weakness for the past two days and multiple episodes of bowel movements. Patient with a history of left sided mastectomy. Patient noted to have a 18G IV on the left AC by EMS. EKG in progress.

## 2023-11-27 NOTE — ED ADULT NURSE NOTE - OBJECTIVE STATEMENT
pt BIBEMS from urgent care c/o whole body weakness for the past x2days with associated multiple episodes of increase bowel movements. Patient with a history of left sided mastectomy. Patient noted to have a 18G IV on the left AC by EMS. EKG in progress. pt BIBEMS from urgent care c/o whole body weakness for the past x2days with associated multiple episodes of increase bowel movements. Pt reports no other medical complaints at this moment. Pt reports having a hx of left sided mastectomy. Patient noted to have a 18G IV on the left AC by EMS (IV removed by RN). Pt is noted to be aox3, able to maintain airway, having nonlabored breathing, no retractions noted, non diaphoretic and able to talk in clear full sentences. IV placed on the right arm and labs were sent .

## 2023-11-27 NOTE — ED PROVIDER NOTE - PROGRESS NOTE DETAILS
Klepfish: Phosphorus 1.5, TSH pending, other labs grossly wnl. Remains very well appearing. Will replenish phos, continue to observe/reassess. Updated pt. Klepfish: Symptoms improving. Remains very well appearing. Pt requested help at home. Evaluated by SW/CM and given info. Steady unassisted gait. Discussed importance of outpt follow up and return precautions. Clinically no indication for further emergent ED workup or hospitalization at this time. Stable for dc, outpt f/u.

## 2023-11-27 NOTE — ED PROVIDER NOTE - OBJECTIVE STATEMENT
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 States that she has increased gas and abd bloating for several months. Also increased BMs though not always diarrhea. Had colonoscopy and was diagnosed w/IBS. Had EGD 6d ago and was started on omperazole. Came to [facility] the next day after syncopal event/head trauma. Na 134, other labs grossly wnl. CTH w/ no acute pathology. DC'd from ED. Pt states that she still feels very dehydrated, generalized weakness, intermittent shakiness, intermittent lightheaded/palpitations, increased gas, decreased appetite. Went to UC and referred to ED. On ROS pt notes minimal HA to L side of head where she hit head last week. No other systemic symptoms.   Denies vision changes, focal weakness/numbness, URI symptoms, SOB/CP, NVD, abd pain, urinary complaints, black/bloody stool, rashes, LE pain/swelling, vertiginous symptoms.

## 2023-11-27 NOTE — ED PROVIDER NOTE - NSFOLLOWUPINSTRUCTIONS_ED_ALL_ED_FT
It is unclear what exactly is causing your symptoms! It is important to continue following up with your doctor outside the hospital and to return to ER for: Persistent fever/vomiting, uncontrolled pain, worsening swelling, worsening breathing, worsening lightheaded, spreading redness.     Stay well hydrated, well rested and well nourished.     Follow up with primary doctor within 1-2 days.     Follow up with cardiologist as soon as possible.     Palpitations    A palpitation is the feeling that your heartbeat is irregular or is faster than normal. It may feel like your heart is fluttering or skipping a beat. They may be caused by many things, including smoking, caffeine, alcohol, stress, and certain medicines. Although most causes of palpitations are not serious, palpitations can be a sign of a serious medical problem. Avoid caffeine, alcohol, and tobacco products at home. Try to reduce stress and anxiety and make sure to get plenty of rest.     SEEK IMMEDIATE MEDICAL CARE IF YOU HAVE ANY OF THE FOLLOWING SYMPTOMS: chest pain, shortness of breath, severe headache, dizziness/lightheadedness, or fainting.     Follow up with cardiologist. Can call [phone] (HEART [facility]) to schedule appointment.    Can also call the following offices:    Dr. Sunita Reynolds, Dr. Lyndon Haque  [phone]  [address], [address]  [place], [place]    Dr. Asif Ramirez  [phone]  [address] [facility], [address]  [place], [place]    Dr. Jose Roberto Case  [phone]    [address], [address] [place], [place]  [phone]  Dr. Drew Bowser, Dr. Mouna Painting, Dr. Asuncion Irwin, Dr. Drew Saenz, Dr. Eulalio Qiuspe, Dr. Feliciano Beltran, Dr. Ju Gastelum, Dr. Asif Ramirez    [address], [address]  [place], [place]  [phone]  Dr. Bryant Cao, Dr. Millicent Hoang, Dr. Cande Dotson, Dr. Jose Roberto Case, Dr. Ximena Desai, Dr. Norman Diaz, Dr. Ruby Dang    [address], 4th floor  [place], [place]  [phone]  Dr. Reinier Diaz, Dr. Richard Pennington, Dr. Jarrett Briscoe, Dr. Feliciano Herr, Dr. Lyndon Haque, Dr. Kelli Reeves    [address]  [phone]  Dr. Alfredo Ulrich, Dr. Lyn Boo, Dr. Ha Brush, Dr. Cande Dotson, Dr. Augusto Zepeda, Dr. Norman Diaz, Dr. John Palma, Dr. Kaushal Martinez, Dr. Chelsea Christiansen    [phone]  Dr. Fahad Guadarrama    [phone]  Dr. Sary Elias, Dr. Feliciano Beltran    [address]  [place], [place]  [phone]  Dr. Sunita Reynolds    [phone]  Dr. Jono Brooks, Dr. Daniel Hernandez, Dr. Griselda Jurado, Dr. Johnny Patterson, Dr. Chel Albert, Dr. Johnny Morgan, Dr. Ximena Harley, Dr. Ernesto Foster, Dr. Ju Gastelum    [phone]  Dr. Chris Pierce, Dr. Kaushal Martinez, Dr. Chelsea Christiansen    [address] [facility], [address]  [place], [place]  [phone]  Dr. Abilio Fulton, Dr. Gadiel Lopez, Dr. Casper Topete, Dr. Trav Candelaria    [address] [place], [place]  [phone]  Dr. Abilio Fulton    [address]  [place], [place]  [phone]  Dr. Feliciano Beltran    [address]  [place], [place]  [phone]  Dr. Casper Topete    [address]  [place], [place]  [phone]  Dr. Ruby Dang    [address]  [place], [place]  [phone]  Dr. Feliciano Beltran    [address]  [place], [place]  [phone]  Dr. Drew Bowser    [address]  [place], [place]  [phone]  Dr. Abilio Fulton, Dr. Gadiel Lopez, Dr. Casper Topete, Dr. Trav Candelaria    [address] [facility], 2nd floor  [place], [place]  [phone]  Dr. Lyndon Haque    [address] [place] [address], 3rd floor  [place], [phone]  Dr. Ha Brush, Dr. Cande Dotson, Dr. Kenny Thomas    [address] [place], [place]  [phone]  Dr. Abilio Fulton, Dr. Gadiel Lopez, Dr. Casper Topete, Dr. Trav Candelaria

## 2023-11-27 NOTE — ED PROVIDER NOTE - PATIENT PORTAL LINK FT
You can access the FollowMyHealth Patient Portal offered by [facility] by registering at the following website: [email or web address]. By joining [employer]’s FollowMyHealth portal, you will also be able to view your health information using other applications (apps) compatible with our system.

## 2023-11-27 NOTE — ED PROVIDER NOTE - PHYSICAL EXAMINATION
PERRL, EOMI, no nystagmus. CN intact. Strength 5/5. Steady unassisted gait. No pronator drift. Sensation intact. Normal speech, no dysarthria. No temporal ttp, no nuchal rigidity. No carotid bruits.   no LE edema, normal equal distal pulses, steady unassisted gait. PERRL, EOMI, no nystagmus. CN intact. Strength 5/5. Steady unassisted gait. No pronator drift. Sensation intact. Normal speech, no dysarthria. No temporal ttp, no nuchal rigidity. No carotid bruits.   no LE edema, normal equal distal pulses, steady unassisted gait.  L periorbital/forehead ecchymosis (from fall last week)

## 2023-11-27 NOTE — ED PROVIDER NOTE - CLINICAL SUMMARY MEDICAL DECISION MAKING FREE TEXT BOX
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 States that she has increased gas and abd bloating for several months. Also increased BMs though not always diarrhea. Had colonoscopy and was diagnosed w/IBS. Had EGD 6d ago and was started on omperazole. Came to [facility] Ed the next day after syncopal event/head trauma. Na 134, other labs grossly wnl. CTH w/ no acute pathology. DC'd from ED. Pt states that she still feels very dehydrated, generalized weakness, intermittent shakiness, intermittent lightheaded/palpitations, increased gas, decreased appetite. Went to UC and referred to ED. On ROS pt notes minimal HA to L side of head where she hit head last week. No other systemic symptoms.   Vitals wnl, exam as above. Very well appearing.   ddx: Unclear etiology. Clinically no acute significant cardiac/intra-abd etiology.   Labs, IVF, reassess.

## 2023-11-28 ENCOUNTER — NON-APPOINTMENT (OUTPATIENT)
Age: 68
End: 2023-11-28

## 2023-11-28 ENCOUNTER — APPOINTMENT (OUTPATIENT)
Dept: HEART AND VASCULAR | Facility: CLINIC | Age: 68
End: 2023-11-28
Payer: MEDICARE

## 2023-11-28 VITALS
BODY MASS INDEX: 19.66 KG/M2 | HEART RATE: 91 BPM | WEIGHT: 118 LBS | HEIGHT: 65 IN | SYSTOLIC BLOOD PRESSURE: 118 MMHG | OXYGEN SATURATION: 97 % | TEMPERATURE: 98 F | DIASTOLIC BLOOD PRESSURE: 80 MMHG

## 2023-11-28 DIAGNOSIS — R00.2 PALPITATIONS: ICD-10-CM

## 2023-11-28 PROCEDURE — 93000 ELECTROCARDIOGRAM COMPLETE: CPT

## 2023-11-28 PROCEDURE — 99203 OFFICE O/P NEW LOW 30 MIN: CPT

## 2023-11-29 ENCOUNTER — APPOINTMENT (OUTPATIENT)
Dept: HEART AND VASCULAR | Facility: CLINIC | Age: 68
End: 2023-11-29
Payer: MEDICARE

## 2023-11-29 ENCOUNTER — TRANSCRIPTION ENCOUNTER (OUTPATIENT)
Age: 68
End: 2023-11-29

## 2023-11-29 VITALS
DIASTOLIC BLOOD PRESSURE: 67 MMHG | WEIGHT: 118 LBS | OXYGEN SATURATION: 98 % | HEIGHT: 65 IN | SYSTOLIC BLOOD PRESSURE: 109 MMHG | BODY MASS INDEX: 19.66 KG/M2 | TEMPERATURE: 97.4 F | HEART RATE: 77 BPM

## 2023-11-29 LAB
APO B SERPL-MCNC: 120 MG/DL
CHOLEST SERPL-MCNC: 241 MG/DL
CRP SERPL HS-MCNC: 0.71 MG/L
HDLC SERPL-MCNC: 54 MG/DL
LDLC SERPL CALC-MCNC: 171 MG/DL
NONHDLC SERPL-MCNC: 187 MG/DL
T4 FREE SERPL-MCNC: 1.6 NG/DL
TRIGL SERPL-MCNC: 87 MG/DL
TSH SERPL-ACNC: 1 UIU/ML

## 2023-11-29 PROCEDURE — 93306 TTE W/DOPPLER COMPLETE: CPT

## 2023-11-30 DIAGNOSIS — Z87.19 PERSONAL HISTORY OF OTHER DISEASES OF THE DIGESTIVE SYSTEM: ICD-10-CM

## 2023-11-30 DIAGNOSIS — S00.12XA CONTUSION OF LEFT EYELID AND PERIOCULAR AREA, INITIAL ENCOUNTER: ICD-10-CM

## 2023-11-30 DIAGNOSIS — Z90.722 ACQUIRED ABSENCE OF OVARIES, BILATERAL: ICD-10-CM
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
## 2023-11-30 DIAGNOSIS — R00.2 PALPITATIONS: ICD-10-CM

## 2023-11-30 DIAGNOSIS — Z90.12 ACQUIRED ABSENCE OF LEFT BREAST AND NIPPLE: ICD-10-CM

## 2023-11-30 DIAGNOSIS — Z88.2 ALLERGY STATUS TO SULFONAMIDES: ICD-10-CM

## 2023-11-30 DIAGNOSIS — Z90.710 ACQUIRED ABSENCE OF BOTH CERVIX AND UTERUS: ICD-10-CM

## 2023-11-30 DIAGNOSIS — R63.0 ANOREXIA: ICD-10-CM

## 2023-11-30 DIAGNOSIS — R14.3 FLATULENCE: ICD-10-CM

## 2023-11-30 DIAGNOSIS — E86.0 DEHYDRATION: ICD-10-CM

## 2023-11-30 DIAGNOSIS — Z86.000 PERSONAL HISTORY OF IN-SITU NEOPLASM OF BREAST: ICD-10-CM

## 2023-11-30 DIAGNOSIS — E83.39 OTHER DISORDERS OF PHOSPHORUS METABOLISM: ICD-10-CM

## 2023-11-30 DIAGNOSIS — R25.1 TREMOR, UNSPECIFIED: ICD-10-CM

## 2023-12-04 ENCOUNTER — TRANSCRIPTION ENCOUNTER (OUTPATIENT)
Age: 68
End: 2023-12-04

## 2023-12-04 LAB — APO LP(A) SERPL-MCNC: 17.5 NMOL/L

## 2023-12-06 ENCOUNTER — TRANSCRIPTION ENCOUNTER (OUTPATIENT)
Age: 68
End: 2023-12-06

## 2023-12-28 ENCOUNTER — TRANSCRIPTION ENCOUNTER (OUTPATIENT)
Age: 68
End: 2023-12-28

## 2024-01-03 ENCOUNTER — TRANSCRIPTION ENCOUNTER (OUTPATIENT)
Age: 69
End: 2024-01-03

## 2024-01-12 ENCOUNTER — TRANSCRIPTION ENCOUNTER (OUTPATIENT)
Age: 69
End: 2024-01-12

## 2024-03-06 ENCOUNTER — NON-APPOINTMENT (OUTPATIENT)
Age: 69
End: 2024-03-06

## 2024-03-06 ENCOUNTER — APPOINTMENT (OUTPATIENT)
Dept: HEART AND VASCULAR | Facility: CLINIC | Age: 69
End: 2024-03-06
Payer: MEDICARE

## 2024-03-06 VITALS
HEART RATE: 73 BPM | WEIGHT: 122 LBS | OXYGEN SATURATION: 98 % | BODY MASS INDEX: 20.33 KG/M2 | TEMPERATURE: 98.3 F | DIASTOLIC BLOOD PRESSURE: 80 MMHG | SYSTOLIC BLOOD PRESSURE: 112 MMHG | HEIGHT: 65 IN

## 2024-03-06 DIAGNOSIS — E78.00 PURE HYPERCHOLESTEROLEMIA, UNSPECIFIED: ICD-10-CM

## 2024-03-06 PROCEDURE — 93000 ELECTROCARDIOGRAM COMPLETE: CPT

## 2024-03-06 PROCEDURE — 99214 OFFICE O/P EST MOD 30 MIN: CPT

## 2024-03-06 NOTE — HISTORY OF PRESENT ILLNESS
[FreeTextEntry1] : 68 F GERD STomach Polyp DCIS left breast mastectomy no chemo or radiation PAIGE baby s/p DAR/BSO Vagal syncope  here for fu of her hypercholesterolemia feels great no cardiac complaints    ecg nsr  11/28/23  calcium score 0 12/5/23

## 2024-03-06 NOTE — ASSESSMENT
[FreeTextEntry1] : - syncope likely vagal  - calcium score is 0 no need for statins  - echo for syncope ef normal  - can do carotid US for IMT - fu in one year

## 2024-11-19 ENCOUNTER — NON-APPOINTMENT (OUTPATIENT)
Age: 69
End: 2024-11-19

## 2024-11-19 ENCOUNTER — APPOINTMENT (OUTPATIENT)
Dept: HEART AND VASCULAR | Facility: CLINIC | Age: 69
End: 2024-11-19
Payer: MEDICARE

## 2024-11-19 VITALS
OXYGEN SATURATION: 97 % | HEIGHT: 65 IN | WEIGHT: 125 LBS | TEMPERATURE: 98.1 F | SYSTOLIC BLOOD PRESSURE: 100 MMHG | BODY MASS INDEX: 20.83 KG/M2 | DIASTOLIC BLOOD PRESSURE: 70 MMHG | HEART RATE: 78 BPM

## 2024-11-19 DIAGNOSIS — E78.00 PURE HYPERCHOLESTEROLEMIA, UNSPECIFIED: ICD-10-CM

## 2024-11-19 PROCEDURE — 36415 COLL VENOUS BLD VENIPUNCTURE: CPT

## 2024-11-19 PROCEDURE — 93000 ELECTROCARDIOGRAM COMPLETE: CPT

## 2024-11-19 PROCEDURE — G2211 COMPLEX E/M VISIT ADD ON: CPT

## 2024-11-19 PROCEDURE — 99214 OFFICE O/P EST MOD 30 MIN: CPT

## 2024-11-20 ENCOUNTER — TRANSCRIPTION ENCOUNTER (OUTPATIENT)
Age: 69
End: 2024-11-20

## 2024-11-20 LAB
ALBUMIN SERPL ELPH-MCNC: 4.3 G/DL
ALP BLD-CCNC: 95 U/L
ALT SERPL-CCNC: 18 U/L
ANION GAP SERPL CALC-SCNC: 12 MMOL/L
AST SERPL-CCNC: 21 U/L
BILIRUB SERPL-MCNC: 0.4 MG/DL
BUN SERPL-MCNC: 14 MG/DL
CALCIUM SERPL-MCNC: 9.2 MG/DL
CHLORIDE SERPL-SCNC: 102 MMOL/L
CHOLEST SERPL-MCNC: 235 MG/DL
CO2 SERPL-SCNC: 25 MMOL/L
CREAT SERPL-MCNC: 0.73 MG/DL
EGFR: 90 ML/MIN/1.73M2
ESTIMATED AVERAGE GLUCOSE: 108 MG/DL
GLUCOSE SERPL-MCNC: 90 MG/DL
HBA1C MFR BLD HPLC: 5.4 %
HDLC SERPL-MCNC: 66 MG/DL
LDLC SERPL CALC-MCNC: 158 MG/DL
NONHDLC SERPL-MCNC: 169 MG/DL
POTASSIUM SERPL-SCNC: 4.5 MMOL/L
PROT SERPL-MCNC: 6.9 G/DL
SODIUM SERPL-SCNC: 138 MMOL/L
TRIGL SERPL-MCNC: 66 MG/DL

## 2024-11-27 ENCOUNTER — TRANSCRIPTION ENCOUNTER (OUTPATIENT)
Age: 69
End: 2024-11-27

## 2025-06-27 ENCOUNTER — EMERGENCY (EMERGENCY)
Facility: HOSPITAL | Age: 70
LOS: 1 days | End: 2025-06-27
Attending: EMERGENCY MEDICINE | Admitting: EMERGENCY MEDICINE
Payer: MEDICARE

## 2025-06-27 VITALS
TEMPERATURE: 97 F | OXYGEN SATURATION: 100 % | DIASTOLIC BLOOD PRESSURE: 85 MMHG | HEART RATE: 60 BPM | WEIGHT: 125 LBS | SYSTOLIC BLOOD PRESSURE: 129 MMHG | RESPIRATION RATE: 18 BRPM | HEIGHT: 65 IN

## 2025-06-27 VITALS — TEMPERATURE: 98 F

## 2025-06-27 DIAGNOSIS — Z90.721 ACQUIRED ABSENCE OF OVARIES, UNILATERAL: ICD-10-CM

## 2025-06-27 DIAGNOSIS — Z90.12 ACQUIRED ABSENCE OF LEFT BREAST AND NIPPLE: ICD-10-CM

## 2025-06-27 DIAGNOSIS — R10.13 EPIGASTRIC PAIN: ICD-10-CM

## 2025-06-27 DIAGNOSIS — R00.1 BRADYCARDIA, UNSPECIFIED: ICD-10-CM

## 2025-06-27 DIAGNOSIS — Z90.710 ACQUIRED ABSENCE OF BOTH CERVIX AND UTERUS: ICD-10-CM

## 2025-06-27 DIAGNOSIS — R11.2 NAUSEA WITH VOMITING, UNSPECIFIED: ICD-10-CM

## 2025-06-27 DIAGNOSIS — R55 SYNCOPE AND COLLAPSE: ICD-10-CM

## 2025-06-27 DIAGNOSIS — Z86.000 PERSONAL HISTORY OF IN-SITU NEOPLASM OF BREAST: ICD-10-CM

## 2025-06-27 DIAGNOSIS — Z88.2 ALLERGY STATUS TO SULFONAMIDES: ICD-10-CM

## 2025-06-27 DIAGNOSIS — K21.9 GASTRO-ESOPHAGEAL REFLUX DISEASE WITHOUT ESOPHAGITIS: ICD-10-CM

## 2025-06-27 DIAGNOSIS — R19.7 DIARRHEA, UNSPECIFIED: ICD-10-CM

## 2025-06-27 LAB
ADD ON TEST-SPECIMEN IN LAB: SIGNIFICANT CHANGE UP
ANION GAP SERPL CALC-SCNC: 8 MMOL/L — SIGNIFICANT CHANGE UP (ref 5–17)
BASOPHILS # BLD AUTO: 0.06 K/UL — SIGNIFICANT CHANGE UP (ref 0–0.2)
BASOPHILS NFR BLD AUTO: 0.5 % — SIGNIFICANT CHANGE UP (ref 0–2)
BUN SERPL-MCNC: 13 MG/DL — SIGNIFICANT CHANGE UP (ref 7–23)
CALCIUM SERPL-MCNC: 8.4 MG/DL — SIGNIFICANT CHANGE UP (ref 8.4–10.5)
CHLORIDE SERPL-SCNC: 105 MMOL/L — SIGNIFICANT CHANGE UP (ref 96–108)
CO2 SERPL-SCNC: 24 MMOL/L — SIGNIFICANT CHANGE UP (ref 22–31)
CREAT SERPL-MCNC: 0.69 MG/DL — SIGNIFICANT CHANGE UP (ref 0.5–1.3)
EGFR: 94 ML/MIN/1.73M2 — SIGNIFICANT CHANGE UP
EGFR: 94 ML/MIN/1.73M2 — SIGNIFICANT CHANGE UP
EOSINOPHIL # BLD AUTO: 0.01 K/UL — SIGNIFICANT CHANGE UP (ref 0–0.5)
EOSINOPHIL NFR BLD AUTO: 0.1 % — SIGNIFICANT CHANGE UP (ref 0–6)
GLUCOSE SERPL-MCNC: 129 MG/DL — HIGH (ref 70–99)
HCT VFR BLD CALC: 38.6 % — SIGNIFICANT CHANGE UP (ref 34.5–45)
HGB BLD-MCNC: 12.8 G/DL — SIGNIFICANT CHANGE UP (ref 11.5–15.5)
IMM GRANULOCYTES # BLD AUTO: 0.06 K/UL — SIGNIFICANT CHANGE UP (ref 0–0.07)
IMM GRANULOCYTES NFR BLD AUTO: 0.5 % — SIGNIFICANT CHANGE UP (ref 0–0.9)
LYMPHOCYTES # BLD AUTO: 0.53 K/UL — LOW (ref 1–3.3)
LYMPHOCYTES NFR BLD AUTO: 4.3 % — LOW (ref 13–44)
MCHC RBC-ENTMCNC: 29.3 PG — SIGNIFICANT CHANGE UP (ref 27–34)
MCHC RBC-ENTMCNC: 33.2 G/DL — SIGNIFICANT CHANGE UP (ref 32–36)
MCV RBC AUTO: 88.3 FL — SIGNIFICANT CHANGE UP (ref 80–100)
MONOCYTES # BLD AUTO: 0.36 K/UL — SIGNIFICANT CHANGE UP (ref 0–0.9)
MONOCYTES NFR BLD AUTO: 2.9 % — SIGNIFICANT CHANGE UP (ref 2–14)
NEUTROPHILS # BLD AUTO: 11.35 K/UL — HIGH (ref 1.8–7.4)
NEUTROPHILS NFR BLD AUTO: 91.7 % — HIGH (ref 43–77)
NRBC # BLD AUTO: 0 K/UL — SIGNIFICANT CHANGE UP (ref 0–0)
NRBC # FLD: 0 K/UL — SIGNIFICANT CHANGE UP (ref 0–0)
NRBC BLD AUTO-RTO: 0 /100 WBCS — SIGNIFICANT CHANGE UP (ref 0–0)
PLATELET # BLD AUTO: 234 K/UL — SIGNIFICANT CHANGE UP (ref 150–400)
PMV BLD: 9.6 FL — SIGNIFICANT CHANGE UP (ref 7–13)
POTASSIUM SERPL-MCNC: 3.9 MMOL/L — SIGNIFICANT CHANGE UP (ref 3.5–5.3)
POTASSIUM SERPL-SCNC: 3.9 MMOL/L — SIGNIFICANT CHANGE UP (ref 3.5–5.3)
RBC # BLD: 4.37 M/UL — SIGNIFICANT CHANGE UP (ref 3.8–5.2)
RBC # FLD: 11.9 % — SIGNIFICANT CHANGE UP (ref 10.3–14.5)
SODIUM SERPL-SCNC: 137 MMOL/L — SIGNIFICANT CHANGE UP (ref 135–145)
WBC # BLD: 12.37 K/UL — HIGH (ref 3.8–10.5)
WBC # FLD AUTO: 12.37 K/UL — HIGH (ref 3.8–10.5)

## 2025-06-27 PROCEDURE — 84484 ASSAY OF TROPONIN QUANT: CPT

## 2025-06-27 PROCEDURE — 82962 GLUCOSE BLOOD TEST: CPT

## 2025-06-27 PROCEDURE — 99284 EMERGENCY DEPT VISIT MOD MDM: CPT | Mod: 25

## 2025-06-27 PROCEDURE — 80076 HEPATIC FUNCTION PANEL: CPT

## 2025-06-27 PROCEDURE — 96374 THER/PROPH/DIAG INJ IV PUSH: CPT

## 2025-06-27 PROCEDURE — 85025 COMPLETE CBC W/AUTO DIFF WBC: CPT

## 2025-06-27 PROCEDURE — 99285 EMERGENCY DEPT VISIT HI MDM: CPT

## 2025-06-27 PROCEDURE — 36415 COLL VENOUS BLD VENIPUNCTURE: CPT

## 2025-06-27 PROCEDURE — 83690 ASSAY OF LIPASE: CPT

## 2025-06-27 PROCEDURE — 80048 BASIC METABOLIC PNL TOTAL CA: CPT

## 2025-06-27 RX ORDER — ONDANSETRON HCL/PF 4 MG/2 ML
1 VIAL (ML) INJECTION
Qty: 20 | Refills: 0
Start: 2025-06-27

## 2025-06-27 RX ADMIN — Medication 20 MILLIGRAM(S): at 12:35

## 2025-06-27 RX ADMIN — Medication 1000 MILLILITER(S): at 12:38

## 2025-06-27 NOTE — ED PROVIDER NOTE - PATIENT'S PREFERRED PRONOUN
Date of Service: 11-15-24 @ 14:59    Patient is a 66y old  Male who presents with a chief complaint of Pain (15 Nov 2024 13:23)      Any change in ROS: seems to be doing  ok : no sob:  on room air:  tolerated procedure well     MEDICATIONS  (STANDING):  abiraterone 500 mg tablet 2 Tablet(s) 2 Tablet(s) Oral daily  acetaminophen     Tablet .. 650 milliGRAM(s) Oral once  atorvastatin 20 milliGRAM(s) Oral at bedtime  chlorhexidine 2% Cloths 1 Application(s) Topical daily  dextrose 5% 1000 milliLiter(s) (75 mL/Hr) IV Continuous <Continuous>  dextrose 5%. 1000 milliLiter(s) (50 mL/Hr) IV Continuous <Continuous>  dextrose 5%. 1000 milliLiter(s) (100 mL/Hr) IV Continuous <Continuous>  dextrose 50% Injectable 25 Gram(s) IV Push once  dextrose 50% Injectable 12.5 Gram(s) IV Push once  dextrose 50% Injectable 25 Gram(s) IV Push once  diphenhydrAMINE 25 milliGRAM(s) Oral once  enoxaparin Injectable 40 milliGRAM(s) SubCutaneous every 24 hours  fenofibrate Tablet 145 milliGRAM(s) Oral daily  FIRST- Mouthwash  BLM 30 milliLiter(s) Swish and Spit three times a day  glucagon  Injectable 1 milliGRAM(s) IntraMuscular once  haloperidol     Tablet 1 milliGRAM(s) Oral <User Schedule>  influenza  Vaccine (HIGH DOSE) 0.5 milliLiter(s) IntraMuscular once  insulin lispro (ADMELOG) corrective regimen sliding scale   SubCutaneous at bedtime  insulin lispro (ADMELOG) corrective regimen sliding scale   SubCutaneous three times a day before meals  lactated ringers 1000 milliLiter(s) (75 mL/Hr) IV Continuous <Continuous>  lactobacillus acidophilus 1 Tablet(s) Oral daily  lidocaine   4% Patch 1 Patch Transdermal every 24 hours  melatonin 6 milliGRAM(s) Oral at bedtime  multivitamin 1 Tablet(s) Oral daily  Nephro-nancy 1 Tablet(s) Oral daily  nystatin    Suspension 324020 Unit(s) Oral three times a day  pantoprazole  Injectable 40 milliGRAM(s) IV Push daily  predniSONE   Tablet 5 milliGRAM(s) Oral daily  tamsulosin 0.8 milliGRAM(s) Oral at bedtime    MEDICATIONS  (PRN):  acetaminophen     Tablet .. 650 milliGRAM(s) Oral every 6 hours PRN Temp greater or equal to 38C (100.4F), Mild Pain (1 - 3), Moderate Pain (4 - 6)  aluminum hydroxide/magnesium hydroxide/simethicone Suspension 30 milliLiter(s) Oral every 4 hours PRN Dyspepsia  artificial tears (preservative free) Ophthalmic Solution 1 Drop(s) Both EYES four times a day PRN Dry Eyes  dextrose Oral Gel 15 Gram(s) Oral once PRN Blood Glucose LESS THAN 70 milliGRAM(s)/deciliter  naloxone Injectable 0.1 milliGRAM(s) IV Push every 3 minutes PRN For ANY of the following changes in patient status:  A. RR LESS THAN 10 breaths per minute, B. Oxygen saturation LESS THAN 90%, C. Sedation score of 6  polyethylene glycol 3350 17 Gram(s) Oral daily PRN Constipation    Vital Signs Last 24 Hrs  T(C): 36.6 (15 Nov 2024 13:11), Max: 36.8 (2024 20:04)  T(F): 97.9 (15 Nov 2024 13:11), Max: 98.2 (2024 20:04)  HR: 92 (15 Nov 2024 13:11) (91 - 101)  BP: 122/83 (15 Nov 2024 13:11) (94/74 - 122/83)  BP(mean): --  RR: 18 (15 Nov 2024 13:11) (17 - 20)  SpO2: 98% (15 Nov 2024 13:11) (97% - 100%)    Parameters below as of 15 Nov 2024 13:11  Patient On (Oxygen Delivery Method): room air        I&O's Summary    2024 07:  -  15 Nov 2024 07:00  --------------------------------------------------------  IN: 500 mL / OUT: 1740 mL / NET: -1240 mL    15 Nov 2024 07:01  -  15 Nov 2024 14:59  --------------------------------------------------------  IN: 240 mL / OUT: 200 mL / NET: 40 mL          Physical Exam:   GENERAL: NAD, well-groomed, well-developed  HEENT: CHAVA/   Atraumatic, Normocephalic  ENMT: No tonsillar erythema, exudates, or enlargement; Moist mucous membranes, Good dentition, No lesions  NECK: Supple, No JVD, Normal thyroid  CHEST/LUNG: Clear to auscultaion  CVS: Regular rate and rhythm; No murmurs, rubs, or gallops  GI: : Soft, Nontender, Nondistended; Bowel sounds present  NERVOUS SYSTEM:  Alert & Oriented X3  EXTREMITIES:  -edema  LYMPH: No lymphadenopathy noted  SKIN: No rashes or lesions  ENDOCRINOLOGY: No Thyromegaly  PSYCH: Appropriate    Labs:                              8.1    5.46  )-----------( 258      ( 15 Nov 2024 05:48 )             25.0                         7.4    6.07  )-----------( 241      ( 2024 05:11 )             23.2                         7.2    5.06  )-----------( 239      ( 2024 04:30 )             23.0                         7.3    5.32  )-----------( 208      ( 2024 04:22 )             23.1     11-15    137  |  104  |  4[L]  ----------------------------<  93  3.3[L]   |  24  |  0.21[L]  11    138  |  106  |  6[L]  ----------------------------<  90  3.2[L]   |  25  |  0.24[L]  1113    136  |  105  |  9   ----------------------------<  143[H]  3.7   |  24  |  0.30[L]  1112    136  |  104  |  5[L]  ----------------------------<  88  3.4[L]   |  22  |  0.27[L]    Ca    7.8[L]      15 Nov 2024 05:48  Ca    7.7[L]      2024 05:11  Phos  3.2     -15  Phos  2.8     11-14  Mg     1.70     11-15  Mg     1.70     11-14    TPro  4.6[L]  /  Alb  2.0[L]  /  TBili  0.5  /  DBili  x   /  AST  24  /  ALT  7   /  AlkPhos  199[H]  11-12    CAPILLARY BLOOD GLUCOSE      POCT Blood Glucose.: 82 mg/dL (15 Nov 2024 12:29)  POCT Blood Glucose.: 96 mg/dL (15 Nov 2024 11:45)  POCT Blood Glucose.: 98 mg/dL (15 Nov 2024 07:18)  POCT Blood Glucose.: 122 mg/dL (2024 20:44)  POCT Blood Glucose.: 91 mg/dL (2024 18:02)        PT/INR - ( 15 Nov 2024 05:48 )   PT: 14.0 sec;   INR: 1.18 ratio         PTT - ( 15 Nov 2024 05:48 )  PTT:31.9 sec  Urinalysis Basic - ( 15 Nov 2024 05:48 )    Color: x / Appearance: x / SG: x / pH: x  Gluc: 93 mg/dL / Ketone: x  / Bili: x / Urobili: x   Blood: x / Protein: x / Nitrite: x   Leuk Esterase: x / RBC: x / WBC x   Sq Epi: x / Non Sq Epi: x / Bacteria: x      radPROCEDURE:   · Procedure Name	Interventional Radiology  · Procedure Name	T3 and T4 kyphoplasty  · TIME OUT	Patient's first and last name, , procedure, and correct site confirmed prior to the start of procedure.  · Procedure Date/Time	15-Nov-2024 11:02  · Informed Consent	Benefits, risks, and possible complications of procedure explained to patient/caregiver who verbalized understanding and gave written consent.  · Procedure Performed By	Myself, Attending  · Estimated Blood Loss	Minimal  · Complications	No complications  · Sedation	Provided by Anesthesia Department  · Local Anesthesia	1% Lidocaine  · Procedure Findings and Details	Successful T3 and T4 kyphoplasty with 1cc of  PMMA cement in each level. Vpad was applied at each level and dry sterile dressing was applied.  · Patient Condition/Disposition	Recovery, then floor if stable        RECENT CULTURES:        RESPIRATORY CULTURES:          Studies  Chest X-RAY  CT SCAN Chest   Venous Dopplers: LE:   CT Abdomen  Others               Her/She

## 2025-06-27 NOTE — ED PROVIDER NOTE - NSICDXPASTMEDICALHX_GEN_ALL_CORE_FT
PAST MEDICAL HISTORY:  DCIS (ductal carcinoma in situ)     GERD (gastroesophageal reflux disease)     Irritable bowel syndrome (IBS)

## 2025-06-27 NOTE — ED ADULT NURSE NOTE - OBJECTIVE STATEMENT
69 y/o F BIBEMS, endorses waking up this morning endorsing lightheadedness and nausea. Pt states "I was scared I was going to pass out". Pt endorses numerous BMs progressing from solid to diarrhea, epigastric discomfort, chills. Pt denies fever, SOB, chest pain, numbness, tingling, blood in stool, Pt A&Ox4, respirations even and unlabored, skin color WDL warm and dry. . No acute distress observed.

## 2025-06-27 NOTE — ED ADULT NURSE NOTE - CAS DISCH CONDITION
Pt A&Ox4, respirations even and unlabored, skin color WDL warm and dry, pt is ambulatory with a steady gait. Pt denied dizziness, lightheadedness, nausea, abdominal pain at discharge No acute distress observed./Improved

## 2025-06-27 NOTE — ED PROVIDER NOTE - NSICDXPASTSURGICALHX_GEN_ALL_CORE_FT
PAST SURGICAL HISTORY:  H/O of hysterectomy with bilateral oophorectomy     S/P mastectomy, left

## 2025-06-27 NOTE — ED PROVIDER NOTE - NSFOLLOWUPINSTRUCTIONS_ED_ALL_ED_FT
Abdominal pain, nausea, vomiting, diarrhea  Near syncope    Take Zofran (ondasetron) - 1 tab on tongue every 6 hours as needed for nausea.     Use your protonix as directed.     Start with clear liquids (water, juice, soda, jello, soup).  Advance to bananas, rice, apple sauce, toast (BRAT) if tolerating clears.  Advance to full diet once tolerating BRAT diet.     Return for increased pain, vomiting or diarrhea, fever, any other concerns.     Nausea / Vomiting    Nausea is the feeling that you have to vomit. As nausea gets worse, it can lead to vomiting. Vomiting puts you at an increased risk for dehydration. Older adults and people with other diseases or a weak immune system are at higher risk for dehydration. Drink clear fluids in small but frequent amounts as tolerated. Eat bland, easy-to-digest foods in small amounts as tolerated.    SEEK IMMEDIATE MEDICAL CARE IF YOU HAVE ANY OF THE FOLLOWING SYMPTOMS: fever, inability to keep sufficient fluids down, black or bloody vomitus, black or bloody stools, lightheadedness/dizziness, chest pain, severe headache, rash, shortness of breath, cold or clammy skin, confusion, pain with urination, or any signs of dehydration.     Diarrhea    Diarrhea is frequent loose or watery bowel movements that has many causes. Diarrhea can make you feel weak and cause you to become dehydrated. Diarrhea typically lasts 2–3 days, but can last longer if it is a sign of something more serious. Drink clear fluids to prevent dehydration. Eat bland, easy-to-digest foods as tolerated.     SEEK IMMEDIATE MEDICAL CARE IF YOU HAVE ANY OF THE FOLLOWING SYMPTOMS: high fevers, lightheadedness/dizziness, chest pain, black or bloody stools, shortness of breath, severe abdominal or back pain, or any signs of dehydration.     Abdominal Pain    Many things can cause abdominal pain. Many times, abdominal pain is not caused by a disease and will improve without treatment. Your health care provider will do a physical exam to determine if there is a dangerous cause of your pain; blood tests and imaging may help determine the cause of your pain. However, in many cases, no cause may be found and you may need further testing as an outpatient. Monitor your abdominal pain for any changes.     SEEK IMMEDIATE MEDICAL CARE IF YOU HAVE ANY OF THE FOLLOWING SYMPTOMS: worsening abdominal pain, uncontrollable vomiting, profuse diarrhea, inability to have bowel movements or pass gas, black or bloody stools, fever accompanying chest pain or back pain, or fainting. These symptoms may represent a serious problem that is an emergency. Do not wait to see if the symptoms will go away. Get medical help right away. Call 911 and do not drive yourself to the hospital.     Syncope    Syncope is when you temporarily lose consciousness, also called fainting or passing out. It is caused by a sudden decrease in blood flow to the brain. Even though most causes of syncope are not dangerous, syncope can possibly be a sign of a serious medical problem. Signs that you may be about to faint include feeling dizzy, lightheaded, nausea, visual changes, or cold/clammy skin. Do not drive, operate heavy machinery, or play sports until your health care provider says it is okay.    SEEK IMMEDIATE MEDICAL CARE IF YOU HAVE ANY OF THE FOLLOWING SYMPTOMS: severe headache, pain in your chest/abdomen/back, bleeding from your mouth or rectum, palpitations, shortness of breath, pain with breathing, seizure, confusion, or trouble walking.

## 2025-06-27 NOTE — ED ADULT NURSE NOTE - CHIEF COMPLAINT QUOTE
Pt co n/v/d, lightheadedness since waking up this morning. Pt states, "I think I got out of bed too fast and felt like I may pass out cause I have passed out before." Pt denies room spinning sensation. Pt also co epigastric pain. Pt received 6mg Zofran and 500cc NS en route by EMS.

## 2025-06-27 NOTE — ED ADULT TRIAGE NOTE - CHIEF COMPLAINT QUOTE
Pt co n/v/d, lightheadedness since waking up this morning. Pt states, "I think I got out of bed too fast and felt like I may pass out cause I have passed out before." Pt denies room spinning sensation. Pt also co epigastric pain. Pt co n/v/d, lightheadedness since waking up this morning. Pt states, "I think I got out of bed too fast and felt like I may pass out cause I have passed out before." Pt denies room spinning sensation. Pt also co epigastric pain. Pt received 6mg Zofran and 500cc NS en route by EMS.

## 2025-06-27 NOTE — ED PROVIDER NOTE - CLINICAL SUMMARY MEDICAL DECISION MAKING FREE TEXT BOX
Pt c/o n/v/d, epigastric pain, now much improved w/o abd ttp on exam.  ? food borne or viral illness, no sig ttp to suggest cholecystitis, pancreatitis. EKG wnl, no c/o cp.  No sig concern for cardiac related gi sx.  Labs wnl, including neg trop.  Plan po challenge, pepcid, dc if braydon po and feeling improved.  Reassess. See progress notes for further mdm related documentation.

## 2025-06-27 NOTE — ED PROVIDER NOTE - PATIENT PORTAL LINK FT
You can access the FollowMyHealth Patient Portal offered by [facility] by registering at the following website: [email or web address]. By joining [employer]’s FollowMyHealth portal, you will also be able to view your health information using other applications (apps) compatible with our system.

## 2025-06-27 NOTE — ED ADULT NURSE REASSESSMENT NOTE - NS ED NURSE REASSESS COMMENT FT1
Pt denies nausea, abdominal discomfort at this time, was able to tolerate one cup of water and 4 saltine packets.

## 2025-06-27 NOTE — ED PROVIDER NOTE - OBJECTIVE STATEMENT
70-year-old female history of DCIS status post left mastectomy, status post hysterectomy and oophorectomy, GERD complaining of waking up feeling slightly lightheaded, walking to the bathroom where she had onset of multiple episodes of loose stool/diarrhea followed by an episode of vomiting (emesis and diarrhea without blood).  Patient reports she then began to feel very sweaty and lightheaded like she might pass out so decided to come to the emergency room.  Patient notes some mild epigastric discomfort that feels similar to when she had reflux issues in the past.  No sick contacts, travel, undercooked foods.  No fever.  Patient now feeling much better.  Patient received 500 cc of saline and some IV Zofran from EMS.  No URI symptoms, chest pain, palpitations, shortness of breath, dysuria.

## 2025-06-27 NOTE — ED PROVIDER NOTE - PROGRESS NOTE DETAILS
Pt feeling better, braydon po's.  Pt c/o gerd - offered maalox but declined.  Epigastric pain improved.  Will jazmine.

## 2025-06-27 NOTE — ED PROVIDER NOTE - CARE PLAN
Principal Discharge DX:	Nausea vomiting and diarrhea  Secondary Diagnosis:	Abdominal pain  Secondary Diagnosis:	Near syncope   1

## 2025-06-28 DIAGNOSIS — Z90.12 ACQUIRED ABSENCE OF LEFT BREAST AND NIPPLE: Chronic | ICD-10-CM

## 2025-06-28 DIAGNOSIS — Z90.710 ACQUIRED ABSENCE OF BOTH CERVIX AND UTERUS: Chronic | ICD-10-CM
